# Patient Record
Sex: FEMALE | Race: WHITE
[De-identification: names, ages, dates, MRNs, and addresses within clinical notes are randomized per-mention and may not be internally consistent; named-entity substitution may affect disease eponyms.]

---

## 2018-06-10 ENCOUNTER — HOSPITAL ENCOUNTER (EMERGENCY)
Dept: HOSPITAL 75 - ER | Age: 23
Discharge: HOME | End: 2018-06-10
Payer: MEDICAID

## 2018-06-10 VITALS — HEIGHT: 65 IN | BODY MASS INDEX: 27.66 KG/M2 | WEIGHT: 166 LBS

## 2018-06-10 VITALS — SYSTOLIC BLOOD PRESSURE: 125 MMHG | DIASTOLIC BLOOD PRESSURE: 82 MMHG

## 2018-06-10 DIAGNOSIS — F32.9: ICD-10-CM

## 2018-06-10 DIAGNOSIS — F17.210: ICD-10-CM

## 2018-06-10 DIAGNOSIS — F41.9: ICD-10-CM

## 2018-06-10 DIAGNOSIS — K64.9: Primary | ICD-10-CM

## 2018-06-10 DIAGNOSIS — G47.9: ICD-10-CM

## 2018-06-10 LAB
AMORPH SED URNS QL MICRO: (no result) /LPF
APTT PPP: YELLOW S
BACTERIA #/AREA URNS HPF: NEGATIVE /HPF
BILIRUB UR QL STRIP: NEGATIVE
FIBRINOGEN PPP-MCNC: (no result) MG/DL
GLUCOSE UR STRIP-MCNC: NEGATIVE MG/DL
KETONES UR QL STRIP: NEGATIVE
LEUKOCYTE ESTERASE UR QL STRIP: (no result)
NITRITE UR QL STRIP: NEGATIVE
PH UR STRIP: 7 [PH] (ref 5–9)
PROT UR QL STRIP: NEGATIVE
RBC #/AREA URNS HPF: (no result) /HPF
RENAL EPI CELLS #/AREA URNS HPF: (no result) /HPF
SP GR UR STRIP: 1.01 (ref 1.02–1.02)
UROBILINOGEN UR-MCNC: NORMAL MG/DL
WBC #/AREA URNS HPF: (no result) /HPF

## 2018-06-10 PROCEDURE — 84703 CHORIONIC GONADOTROPIN ASSAY: CPT

## 2018-06-10 PROCEDURE — 81000 URINALYSIS NONAUTO W/SCOPE: CPT

## 2018-06-10 PROCEDURE — 99282 EMERGENCY DEPT VISIT SF MDM: CPT

## 2018-06-10 NOTE — XMS REPORT
Electronic Copy

 Created on: 2016



MARIE BARNETT

External Reference #: Avatar : [id number]

: 1995

Sex: Female



Demographics







 Address  [address] [place] [address]  [address]

 

 Home Phone  [phone]

 

 Preferred Language  Unknown

 

 Marital Status  Unknown

 

 Caodaism Affiliation  Unknown

 

 Race  Unknown

 

 Ethnic Group  Unknown





Author







 Author  ALAN MACDONALD

 

 Organization  Unknown

 

 Address  [address]  [address]



 

 Phone  [phone]







Care Team Providers







 Care Team Member Name  Role  Phone

 

 ALAN MACDONALD  Unavailable  [phone]



                      



Problems

           





 Problem           SNOMED           Onset Date           Resolved Date         
  Status        

 

 Recurrent major depressive episodes, moderate           [id number]           
                       Active        

 

 Posttraumatic stress disorder           [id number]                    
              Active        

 

 Insomnia           [id number]                                  Active
        



                                                 



Allergies, Adverse Reactions

     NA                                  



Care Plan

           

      





 Goal           Instructions        

 

  Child will be functioning well in all rosa.            Further assessment 
by [facility] treatment team.  Engage with treatment team to build rapport.  
Learn and practice coping skills to reduce symptoms and improve functioning.  
The following Services will be utilized 1 - 3 times until goal is reached:     
   

 

  Child will be functioning well in all rosa.            Further assessment 
by [facility] treatment team.  Engage with treatment team to build rapport.  
Learn and practice coping skills to reduce symptoms and improve functioning.  
The following Services will be utilized 1 - 3 times until goal is reached:     
   

 

  Improve and maintain functioning through medical psychiatric services.       
     Initial Psychiatric Evaluation, Ongoing medication monitoring and 
management, Case Conference with multidisciplinary members of the [facility] team as 
indicated, and/or Collaboration and coordination with outside medical providers 
as indicated by providing the following services: 56023 interactive complexity 
40396 psychiatric diagnostic eval w/ meds 96238 30 min psychotherapy add-on 
68918 45 [place] [address] 60 min psychotherapy add-on 29516 med 
injection 42038 New Patient E&M (level 1) 03045 New Patient E&M (level 2) 05109 
New Patient E&M (level 3) 60222 New patient E&M (level 4) 15318 New Patient E&M 
(level 5) 25432 Established Patient E&M (level 1) 14173 Established Patient E&M 
(level 2) 91302 Established Patient E&M (level 3) 71739 Established Patient E&
amp;M (level 4) 82008 Established Patient E&M (level 5) 9935x prolonged service 
code  66943 case conference w/o efrnando & ya w/ MD 86581 case conference w/o fernando mobley
/ MD    Peer Support Individual    Psychosocial Rehab Indiv        



      

      

      





 Date           Time           Service           Provider           Location   
     

 

            05:00:00 pm           [facility]           [address]        



      

                                       



Medications

           





 Medication           Code           Dose,Form,Route,Freq           Start Date 
          End Date        

 

 Rexulti - 1 MG ORAL Tablet           [id number]           Take one (1) Tablet 
Daily                   

 

 Rexulti - 2 MG ORAL Tablet           [id number]           Take one (1) Tablet 
Daily                   

 

 Abilify - 5 MG ORAL Tablet           [id number]           Take one (1) Tablet At 
Bedtime                      2016/01/15        

 

 Rexulti - 1 MG ORAL Tablet           [id number]           Take two (2) Tablets 
Daily                   

 

 Rexulti - 2 MG ORAL Tablet           [id number]           Take one (1) Tablet 
Daily                   



                                                           



Lab Results

     NA                                  



Encounters

           





 Date           Time           Service           Code           Provider        

 

            11:44:00 am                                   ALAN MACDONALD        



                                       



Family History

                                  



Functional Status

     NA                                  



Immunizations

     NA                                  



Vital Signs

           





 Date           Time           BP           Pulse           Temp           
Height           Weight           BMI        

 

            05:56:00 pm           103 over 67           84 bpm       
                67 in           197 lbs           30.9 kg/m^2        



                                       



Social History

           





 Date           Smoking Status           SNOMED Code        

 

            Unknown If Ever Smoked           [id number]        



                                       



Hospital Discharge Instructions

     NA                             



Instructions

                  *         Not Applicable       

                                             



Procedures

     NA                                  



Purpose

           Electronic Copy

## 2018-06-10 NOTE — XMS REPORT
Electronic Copy

 Created on: 2015



MARIE BARNETT

External Reference #: Avatar : [id number]

: 1995

Sex: Female



Demographics







 Address  [address] [place] [address]  [address]

 

 Home Phone  [phone]

 

 Preferred Language  Unknown

 

 Marital Status  Unknown

 

 Adventist Affiliation  Unknown

 

 Race  Unknown

 

 Ethnic Group  Unknown





Author







 Author  ALAN MACDONALD

 

 Organization  Unknown

 

 Address  [address]  [address]



 

 Phone  [phone]







Care Team Providers







 Care Team Member Name  Role  Phone

 

 ALAN MACDONALD  Unavailable  [phone]



                      



Problems

           





 Problem           SNOMED           Onset Date           Resolved Date         
  Status        

 

 N/A           N/A           N/A           N/A           N/A        



                                       



Allergies, Adverse Reactions

     NA                                  



Care Plan

           

      





 Goal           Instructions        

 

  Child will be functioning well in all rosa.            Further assessment 
by [facility] treatment team.  Engage with treatment team to build rapport.  
Learn and practice coping skills to reduce symptoms and improve functioning.  
The following Services will be utilized 1 - 3 times until goal is reached:     
   

 

  Child will be functioning well in all rosa.            Further assessment 
by [facility] treatment team.  Engage with treatment team to build rapport.  
Learn and practice coping skills to reduce symptoms and improve functioning.  
The following Services will be utilized 1 - 3 times until goal is reached:     
   

 

  Improve and maintain functioning through medical psychiatric services.       
     Initial Psychiatric Evaluation, Ongoing medication monitoring and 
management, Case Conference with multidisciplinary members of the [facility] team as 
indicated, and/or Collaboration and coordination with outside medical providers 
as indicated by providing the following services: 68828 interactive complexity 
54548 psychiatric diagnostic eval w/ meds 60054 30 min psychotherapy add-on 
53311 45 [place] [address]7076 60 min psychotherapy add-on 80391 med 
injection 06892 New Patient E&M (level 1) 18964 New Patient E&M (level 2) 77451 
New Patient E&M (level 3) 60142 New patient E&M (level 4) 58545 New Patient E&M 
(level 5) 34257 Established Patient E&M (level 1) 61992 Established Patient E&M 
(level 2) 74164 Established Patient E&M (level 3) 02080 Established Patient E&
amp;M (level 4) 90013 Established Patient E&M (level 5) 9935x prolonged service 
code  10997 case conference w/o clt & fam w/ MD 04091 case conference w/o clt itz
/ MD    Peer Support Individual    Psychosocial Rehab Indiv        



      

      

      





 Date           Time           Service           Provider           Location   
     

 

            05:30:00 pm           [facility]           [address]        



      

                                       



Medications

     NA                                  



Lab Results

     NA                                  



Encounters

           





 Date           Time           Service           Code           Provider        

 

            11:44:00 am                                   ALAN MACDONALD        



                                       



Family History

                                  



Functional Status

     NA                                  



Immunizations

     NA                                  



Vital Signs

     NA                                  



Social History

           





 Date           Smoking Status           SNOMED Code        

 

            Unknown If Ever Smoked           [id number]        



                                       



Hospital Discharge Instructions

     NA                             



Instructions

                  *         Not Applicable       

                                             



Procedures

     NA                                  



Purpose

           Electronic Copy

## 2018-06-10 NOTE — XMS REPORT
Electronic Copy

 Created on: 2016



MARIE BARNETT

External Reference #: Avatar : [id number]

: 1995

Sex: Female



Demographics







 Address  [address] [place] [address]  [address]

 

 Home Phone  [phone]

 

 Preferred Language  Unknown

 

 Marital Status  Unknown

 

 Jain Affiliation  Unknown

 

 Race  Unknown

 

 Ethnic Group  Unknown





Author







 Author  ALAN MACDONALD

 

 Organization  Unknown

 

 Address  [address]  [address]



 

 Phone  [phone]







Care Team Providers







 Care Team Member Name  Role  Phone

 

 ALAN MACDONALD  Unavailable  [phone]



                      



Problems

           





 Problem           SNOMED           Onset Date           Resolved Date         
  Status        

 

 Recurrent major depressive episodes, moderate           [id number]           
                       Active        

 

 Posttraumatic stress disorder           97668875                    
              Active        

 

 Insomnia           [id number]                                  Active
        



                                                 



Allergies, Adverse Reactions

     NA                                  



Care Plan

           

      





 Goal           Instructions        

 

  Child will be functioning well in all rosa.            Further assessment 
by [facility] treatment team.  Engage with treatment team to build rapport.  
Learn and practice coping skills to reduce symptoms and improve functioning.  
The following Services will be utilized 1 - 3 times until goal is reached:     
   

 

  Child will be functioning well in all rosa.            Further assessment 
by [facility] treatment team.  Engage with treatment team to build rapport.  
Learn and practice coping skills to reduce symptoms and improve functioning.  
The following Services will be utilized 1 - 3 times until goal is reached:     
   

 

  Improve and maintain functioning through medical psychiatric services.       
     Initial Psychiatric Evaluation, Ongoing medication monitoring and 
management, Case Conference with multidisciplinary members of the [facility] [place] as 
indicated, and/or Collaboration and coordination with outside medical providers 
as indicated by providing the following services: 55581 interactive complexity 
55386 psychiatric diagnostic eval w/ meds 62885 30 min psychotherapy add-on 
88035 45 [place] [address] 60 min psychotherapy add-on 36277 med 
injection 45841 New Patient E&M (level 1) 49863 New Patient E&M (level 2) 38337 
New Patient E&M (level 3) 84370 New patient E&M (level 4) 66539 New Patient E&M 
(level 5) 05148 Established Patient E&M (level 1) 20823 Established Patient E&M 
(level 2) 51914 Established Patient E&M (level 3) 76965 Established Patient E&
amp;M (level 4) 93207 Established Patient E&M (level 5) 9935x prolonged service 
code  41973 case conference w/o fernando & ya mobley/ MD 52303 case conference w/o fernando mobley
/ MD    Peer Support Individual    Psychosocial Rehab Indiv        

 

  Improve and maintain functioning through medical psychiatric services.       
     Initial Psychiatric Evaluation, Ongoing medication monitoring and 
management, Case Conference with multidisciplinary members of the [facility] [place] as 
indicated, and/or Collaboration and coordination with outside medical providers 
as indicated by providing the following services: 28639 interactive complexity 
13579 psychiatric diagnostic eval w/ meds 96626 30 min psychotherapy add-on 
16971 45 [place] on 36997 60 min psychotherapy add-on 82510 med 
injection 77998 New Patient E&M (level 1) 83643 New Patient E&M (level 2) 16486 
New Patient E&M (level 3) 26267 New patient E&M (level 4) 05260 New Patient E&M 
(level 5) 32286 Established Patient E&M (level 1) 99003 Established Patient E&M 
(level 2) 47214 Established Patient E&M (level 3) 10858 Established Patient E&
amp;M (level 4) 95384 Established Patient E&M (level 5) 9935x prolonged service 
code  71349 case conference w/o fernando & ya aguero MD 05510 case conference w/o fernando mobley
/ MD    Peer Support Individual    Psychosocial Rehab Indiv        



      

      

      





 Date           Time           Service           Provider           Location   
     

 

            05:00:00 pm           [facility]           [address]        



      

                                       



Medications

           





 Medication           Code           Dose,Form,Route,Freq           Start Date 
          End Date        

 

 Rexulti - 1 MG ORAL Tablet           [id number]           Take one (1) Tablet 
Daily                   

 

 Rexulti - 2 MG ORAL Tablet           [id number]           Take one (1) Tablet 
Daily                   

 

 Abilify - 5 MG ORAL Tablet           [id number]           Take one (1) Tablet At 
Bedtime                      2016/01/15        

 

 Rexulti - 1 MG ORAL Tablet           [id number]           Take two (2) Tablets 
Daily                   

 

 Rexulti - 2 MG ORAL Tablet           [id number]           Take one (1) Tablet 
Daily                   



                                                           



Lab Results

     NA                                  



Encounters

           





 Date           Time           Service           Code           Provider        

 

            11:44:00 am                                   ALAN MACDONALD        



                                       



Family History

                                  



Functional Status

     NA                                  



Immunizations

     NA                                  



Vital Signs

           





 Date           Time           BP           Pulse           Temp           
Height           Weight           BMI        

 

            05:56:00 pm           103 over 67           84 bpm       
                67 in           197 lbs           30.9 kg/m^2        



                                       



Social History

           





 Date           Smoking Status           SNOMED Code        

 

            Unknown If Ever Smoked           [id number]        



                                       



Hospital Discharge Instructions

     NA                             



Instructions

                  *         Not Applicable       

                                             



Procedures

     NA                                  



Purpose

           Electronic Copy

## 2018-06-10 NOTE — ED GU-FEMALE
General


Chief Complaint:  Rect Problems


Stated Complaint:  RECTAL BLEEDING


Nursing Triage Note:  


Pt reports rectal bleed issues intermittantly from exposure to anal intercourse 


1 yr ago. No current reported injury and denies any f/u from last yr.


Nursing Sepsis Screen:  No Definite Risk





History of Present Illness


Date Seen by Provider:  Yonis 10, 2018


Time Seen by Provider:  17:10


Initial Comments


22-year-old  female reports one year ago having anal intercourse, 

since then she has been having intermittent bleeding after bowel movements. She 

has tried suppositories little improvement in her symptoms. Today the pain was 

significant after she had a large bowel movement.


Timing/Duration:  getting worse


Severity/Quality:  dull, sharp


Location:  other





Allergies and Home Medications


Allergies


Coded Allergies:  


     No Known Drug Allergies (Unverified , 8/16/09)





Home Medications


Hydrocortisone Acetate 30 Mg Supp.rect, 30 MG RC Q6H PRN for PAIN-MILD


   Prescribed by: TABBY GARCIA on 6/10/18 6867





Patient Home Medication List


Home Medication List Reviewed:  Yes





Review of Systems


Constitutional:  no symptoms reported, see HPI


Genitourinary:  see HPI, other (rectal pain and bleeding)


All Other Systemes Reviewed


Negative Unless Noted:  Yes





Past Medical-Social-Family Hx


Past Med/Social Hx:  Reviewed Nursing Past Med/Soc Hx


Patient Social History


Alcohol Use:  Rarely Uses


Recreational Drug Use:  No


Smoking Status:  Current Everyday Smoker


Type Used:  Cigarettes


2nd Hand Smoke Exposure:  No


Recent Foreign Travel:  No


Contact w/Someone Who Travel:  No


Recent Infectious Disease Expo:  No


Recent Hopitalizations:  No





Seasonal Allergies


Seasonal Allergies:  No





Past Medical History


Surgeries:  No


Respiratory:  No


Cardiac:  No


Neurological:  No


Genitourinary:  No


Gastrointestinal:  No


Musculoskeletal:  No


Endocrine:  No


HEENT:  No


Cancer:  No


Psychosocial:  Yes


Sleep Difficulties, Anxiety, Depression


Integumentary:  No


Blood Disorders:  No


Adverse Reaction/Blood Tranf:  No





Physical Exam


Vital Signs





Vital Signs - First Documented








 6/10/18





 16:20


 


Temp 97.2


 


Pulse 93


 


Resp 20


 


B/P (MAP) 125/82 (96)


 


Pulse Ox 98


 


O2 Delivery Room Air





Capillary Refill : Less Than 3 Seconds


General Appearance:  WD/WN, no apparent distress


Cardiovascular:  normal peripheral pulses, regular rate, rhythm


Respiratory:  chest non-tender, lungs clear, normal breath sounds


Gastrointestinal:  normal bowel sounds, non tender, soft


Rectal:  normal exam, normal rectal tone, hemorrhoids (external on the left, 

internal hemorrhoids inflamed and tender. No fissures or active bleeding noted)


Neurologic/Psychiatric:  no motor/sensory deficits, alert, normal mood/affect, 

oriented x 3





Progress/Results/Core Measures


Suspected Sepsis


Recent Fever Within 48 Hours:  No


Infection Criteria Present:  None


New/Unexplained  Altered Menta:  No


Sepsis Screen:  No Definite Risk


SIRS


Temperature:97.2 


Pulse: 93 


Respiratory Rate: 20


 


Blood Pressure 125 /82 


Mean: 96





Results/Orders


Lab Results





Laboratory Tests








Test


 6/10/18


16:49 Range/Units


 


 


Urine Color YELLOW   


 


Urine Clarity VERY CLOUDY H  


 


Urine pH 7  5-9  


 


Urine Specific Gravity 1.010 L 1.016-1.022  


 


Urine Protein NEGATIVE  NEGATIVE  


 


Urine Glucose (UA) NEGATIVE  NEGATIVE  


 


Urine Ketones NEGATIVE  NEGATIVE  


 


Urine Nitrite NEGATIVE  NEGATIVE  


 


Urine Bilirubin NEGATIVE  NEGATIVE  


 


Urine Urobilinogen NORMAL  NORMAL  MG/DL


 


Urine Leukocyte Esterase 3+ H NEGATIVE  


 


Urine RBC (Auto) 3+ H NEGATIVE  


 


Urine RBC RARE   /HPF


 


Urine WBC 0-2   /HPF


 


Urine Squamous Epithelial


Cells 10-25 H


  /HPF





 


Urine Renal Epithelial Cells NONE   /HPF


 


Urine Crystals PRESENT H  /LPF


 


Urine Amorphous Sediment


 LARGE EMILIANA


URATES H  /LPF





 


Urine Bacteria NEGATIVE   /HPF


 


Urine Casts NONE   /LPF


 


Urine Mucus NEGATIVE   /LPF


 


Urine Culture Indicated NO   








My Orders





Orders - TABBY GARCIA


Urine Pregnancy Bedside (6/10/18 16:54)


Ua Culture If Indicated (6/10/18 16:54)


Urine Pregnancy Bedside (6/10/18 16:55)





Vital Signs/I&O











 6/10/18 6/10/18





 16:20 17:40


 


Temp 97.2 97.2


 


Pulse 93 93


 


Resp 20 20


 


B/P (MAP) 125/82 (96) 125/82 (96)


 


Pulse Ox 98 98


 


O2 Delivery Room Air 





Capillary Refill : Less Than 3 Seconds








Blood Pressure Mean:  96











Point of Care Testing


Urine Pregnancy-Bedside:  Negative





Departure


Impression





 Primary Impression:  


 Hemorrhoids


 Qualified Codes:  K64.1 - Second degree hemorrhoids


 Additional Impression:  


 Rectal bleeding


Disposition:  01 HOME, SELF-CARE


Condition:  Stable





Departure-Patient Inst.


Decision time for Depature:  17:30


Referrals:  


GEOFFREY LUCIA DO (PCP/Family)


Primary Care Physician


Patient Instructions:  Hemorrhoids (DC)





Add. Discharge Instructions:  


Take an over-the-counter daily stool softener, Docusate Sodium 100 mg twice 

daily. 


Warm sitz baths. 


Avoid anything being inserted in Rectum. 


Use Steroid Suppositories as directed. 


Follow-up with Dr. Lucia if symptoms are not improving or worsen to 

consider referral to general surgery for further treatment of the hemorrhoids


Return to emergency department for new urgent health care needs.





All discharge instructions reviewed with patient and/or family. Voiced 

understanding.


Scripts


Hydrocortisone Acetate (Hydrocortisone Acetate) 30 Mg Supp.rect


30 MG RC Q6H PRN for PAIN-MILD, #15 SUPP.RECT 0 Refills


   Prov: TABBY GARCIA         6/10/18





Copy


Copies To 1:   GEOFFREY LUCIA DO











TABBY GARCIA Yonis 10, 2018 17:34

## 2018-06-10 NOTE — XMS REPORT
Electronic Copy

 Created on: 2015



MARIE BARNETT

External Reference #: Avatar : [id number]

: 1995

Sex: Female



Demographics







 Address  [address] [place] [address]  [address]

 

 Home Phone  [phone]

 

 Preferred Language  Unknown

 

 Marital Status  Unknown

 

 Lutheran Affiliation  Unknown

 

 Race  Unknown

 

 Ethnic Group  Unknown





Author







 Author  ALAN MACDONALD

 

 Organization  Unknown

 

 Address  [address]  [address]



 

 Phone  [phone]







Care Team Providers







 Care Team Member Name  Role  Phone

 

 ALAN MACDONALD  Unavailable  [phone]



                      



Problems

           





 Problem           SNOMED           Onset Date           Resolved Date         
  Status        

 

 N/A           N/A           N/A           N/A           N/A        



                                       



Allergies, Adverse Reactions

     NA                                  



Care Plan

           

      





 Goal           Instructions        

 

  Child will be functioning well in all rosa.            Further assessment 
by [facility] treatment team.  Engage with treatment team to build rapport.  
Learn and practice coping skills to reduce symptoms and improve functioning.  
The following Services will be utilized 1 - 3 times until goal is reached:     
   

 

  Child will be functioning well in all rosa.            Further assessment 
by [facility] treatment team.  Engage with treatment team to build rapport.  
Learn and practice coping skills to reduce symptoms and improve functioning.  
The following Services will be utilized 1 - 3 times until goal is reached:     
   

 

  Improve and maintain functioning through medical psychiatric services.       
     Initial Psychiatric Evaluation, Ongoing medication monitoring and 
management, Case Conference with multidisciplinary members of the [facility] team as 
indicated, and/or Collaboration and coordination with outside medical providers 
as indicated by providing the following services: 74151 interactive complexity 
80444 psychiatric diagnostic eval w/ meds 92361 30 min psychotherapy add-on 
18865 45 [place] [address] 60 min psychotherapy add-on 27073 med 
injection 32711 New Patient E&M (level 1) 04944 New Patient E&M (level 2) 15344 
New Patient E&M (level 3) 82639 New patient E&M (level 4) 93134 New Patient E&M 
(level 5) 44541 Established Patient E&M (level 1) 41717 Established Patient E&M 
(level 2) 56399 Established Patient E&M (level 3) 49553 Established Patient E&
amp;M (level 4) 12093 Established Patient E&M (level 5) 9935x prolonged service 
code  29428 case conference w/o clt & fam w/ MD 30566 case conference w/o clt itz
/ MD    Peer Support Individual    Psychosocial Rehab Indiv        



      

      

      





 Date           Time           Service           Provider           Location   
     

 

            03:30:00 pm           NEW PATIENT E&M LEVEL III           
[facility] [address] [address]        



      

                                       



Medications

     NA                                  



Lab Results

     NA                                  



Encounters

           





 Date           Time           Service           Code           Provider        

 

            11:44:00 am                                   ALAN MACDONALD        

 

            12:00:00 am           PSYCHIATRIC DIAGNOSTIC EVALUATION  
         84850           ALAN MACDONALD        



                                       



Family History

                                  



Functional Status

     NA                                  



Immunizations

     NA                                  



Vital Signs

     NA                                  



Social History

           





 Date           Smoking Status           SNOMED Code        

 

            Unknown If Ever Smoked           [id number]        



                                       



Hospital Discharge Instructions

     NA                             



Instructions

                  *         Not Applicable       

                                             



Procedures

     NA                                  



Purpose

           Electronic Copy

## 2018-06-10 NOTE — XMS REPORT
Electronic Copy

 Created on: 2016



MARIE BARNETT

External Reference #: Avatar : [id number]

: 1995

Sex: Female



Demographics







 Address  [address] [place] [address]  [address]

 

 Home Phone  [phone]

 

 Preferred Language  Unknown

 

 Marital Status  Unknown

 

 Muslim Affiliation  Unknown

 

 Race  Unknown

 

 Ethnic Group  Unknown





Author







 Author  ALAN MACDONALD

 

 Organization  Unknown

 

 Address  [address]  [address]



 

 Phone  [phone]







Care Team Providers







 Care Team Member Name  Role  Phone

 

 ALAN MACDONALD  Unavailable  [phone]



                      



Problems

           





 Problem           SNOMED           Onset Date           Resolved Date         
  Status        

 

 Recurrent major depressive episodes, moderate           [id number]           
                       Active        

 

 Posttraumatic stress disorder           [id number]                    
              Active        

 

 Insomnia           [id number]                                  Active
        



                                                 



Allergies, Adverse Reactions

     NA                                  



Care Plan

           

      





 Goal           Instructions        

 

  Child will be functioning well in all rosa.            Further assessment 
by [facility] treatment team.  Engage with treatment team to build rapport.  
Learn and practice coping skills to reduce symptoms and improve functioning.  
The following Services will be utilized 1 - 3 times until goal is reached:     
   

 

  Child will be functioning well in all rosa.            Further assessment 
by [facility] treatment team.  Engage with treatment team to build rapport.  
Learn and practice coping skills to reduce symptoms and improve functioning.  
The following Services will be utilized 1 - 3 times until goal is reached:     
   

 

  Improve and maintain functioning through medical psychiatric services.       
     Initial Psychiatric Evaluation, Ongoing medication monitoring and 
management, Case Conference with multidisciplinary members of the [facility] team as 
indicated, and/or Collaboration and coordination with outside medical providers 
as indicated by providing the following services: 99969 interactive complexity 
98420 psychiatric diagnostic eval w/ meds 59104 30 min psychotherapy add-on 
66257 45 [place] on 625[address] 60 min psychotherapy add-on 53001 med 
injection 93603 New Patient E&M (level 1) 95393 New Patient E&M (level 2) 94244 
New Patient E&M (level 3) 49899 New patient E&M (level 4) 07116 New Patient E&M 
(level 5) 95729 Established Patient E&M (level 1) 89874 Established Patient E&M 
(level 2) 15633 Established Patient E&M (level 3) 46978 Established Patient E&
amp;M (level 4) 32468 Established Patient E&M (level 5) 9935x prolonged service 
code  68047 case conference w/o fernando & ya aguero MD 71623 case conference w/o fernando mobley
/ MD    Peer Support Individual    Psychosocial Rehab Indiv        

 

  Improve and maintain functioning through medical psychiatric services.       
     Initial Psychiatric Evaluation, Ongoing medication monitoring and 
management, Case Conference with multidisciplinary members of the [facility] team as 
indicated, and/or Collaboration and coordination with outside medical providers 
as indicated by providing the following services: 70432 interactive complexity 
87899 psychiatric diagnostic eval w/ meds 96994 30 min psychotherapy add-on 
27903 45 [place] on 28424 60 min psychotherapy add-on 12982 med 
injection 99146 New Patient E&M (level 1) 96364 New Patient E&M (level 2) 11047 
New Patient E&M (level 3) 25681 New patient E&M (level 4) 32965 New Patient E&M 
(level 5) 14103 Established Patient E&M (level 1) 13213 Established Patient E&M 
(level 2) 85513 Established Patient E&M (level 3) 95294 Established Patient E&
amp;M (level 4) 11089 Established Patient E&M (level 5) 9935x prolonged service 
code  36236 case conference w/o fernando & ya aguero MD 86784 case conference w/o fernando mobley
/ MD    Peer Support Individual    Psychosocial Rehab Indiv        



      

      

      

                                  



Medications

           





 Medication           Code           Dose,Form,Route,Freq           Start Date 
          End Date        

 

 Rexulti - 1 MG ORAL Tablet           3074103           Take one (1) Tablet 
Daily                   

 

 Rexulti - 2 MG ORAL Tablet           9887833           Take one (1) Tablet 
Daily                   

 

 Abilify - 5 MG ORAL Tablet           364847           Take one (1) Tablet At 
Bedtime                   

 

 Rexulti - 1 MG ORAL Tablet           5604168           Take two (2) Tablets 
Daily                   

 

 Rexulti - 2 MG ORAL Tablet           [id number]           Take one (1) Tablet 
Daily                   

 

 Citalopram - 20 MG ORAL Tablet           536706           Take one (1) Tablet 
Daily                   



                                                                



Lab Results

     NA                                  



Encounters

           





 Date           Time           Service           Code           Provider        

 

            11:44:00 am                                   ALAN MACDONALD        



                                       



Family History

                                  



Functional Status

     NA                                  



Immunizations

     NA                                  



Vital Signs

           





 Date           Time           BP           Pulse           Temp           
Height           Weight           BMI        

 

            05:15:00 pm           106 over 70           85 bpm       
                67 in           192.7 lbs           30.2 kg/m^2        

 

            05:56:00 pm           103 over 67           84 bpm       
                67 in           197 lbs           30.9 kg/m^2        



                                            



Social History

           





 Date           Smoking Status           SNOMED Code        

 

            Unknown If Ever Smoked           [id number]        



                                       



Hospital Discharge Instructions

     NA                             



Instructions

                  *         Not Applicable       

                                             



Procedures

     NA                                  



Purpose

           Electronic Copy

## 2018-06-10 NOTE — XMS REPORT
Electronic Copy

 Created on: 03/15/2016



MARIE BARNETT

External Reference #: Avatar : [id number]

: 1995

Sex: Female



Demographics







 Address  [address] [place] [address]  [address]

 

 Home Phone  [phone]

 

 Preferred Language  Unknown

 

 Marital Status  Unknown

 

 Sikh Affiliation  Unknown

 

 Race  Unknown

 

 Ethnic Group  Unknown





Author







 Author  ALAN MACDONALD

 

 Organization  Unknown

 

 Address  [address] [place] [address]  [address]



 

 Phone  [phone]







Care Team Providers







 Care Team Member Name  Role  Phone

 

 ALAN MACDONALD  Unavailable  [phone]

 

 DOTGWENDOLYN GARCÍA  Unavailable  [phone]



                      



Problems

           





 Problem           SNOMED           Onset Date           Resolved Date         
  Status        

 

 Recurrent major depressive episodes, moderate           [id number]           
                       Active        

 

 Posttraumatic stress disorder           [id number]                    
              Active        

 

 Insomnia           [id number]                                  Active
        

 

 Review of medication           [id number]                            
      Active        



                                                      



Allergies, Adverse Reactions

     NA                                  



Care Plan

           

      





 Goal           Instructions        

 

  Child will be functioning well in all rosa.            Further assessment 
by [facility] treatment team.  Engage with treatment team to build rapport.  
Learn and practice coping skills to reduce symptoms and improve functioning.  
The following Services will be utilized 1 - 3 times until goal is reached:     
   

 

  Child will be functioning well in all rosa.            Further assessment 
by [facility] treatment team.  Engage with treatment team to build rapport.  
Learn and practice coping skills to reduce symptoms and improve functioning.  
The following Services will be utilized 1 - 3 times until goal is reached:     
   

 

  Improve and maintain functioning through medical psychiatric services.       
     Initial Psychiatric Evaluation, Ongoing medication monitoring and 
management, Case Conference with multidisciplinary members of the [facility] team as 
indicated, and/or Collaboration and coordination with outside medical providers 
as indicated by providing the following services: 13578 interactive complexity 
60118 psychiatric diagnostic eval w/ meds 21086 30 min psychotherapy add-on 
56270 45 [place] 479[address] 60 min psychotherapy add-on 18412 med 
injection 38935 New Patient E&M (level 1) 12185 New Patient E&M (level 2) 03657 
New Patient E&M (level 3) 41128 New patient E&M (level 4) 70047 New Patient E&M 
(level 5) 87251 Established Patient E&M (level 1) 90604 Established Patient E&M 
(level 2) 47343 Established Patient E&M (level 3) 46903 Established Patient E&
amp;M (level 4) 90390 Established Patient E&M (level 5) 9935x prolonged service 
code  19220 case conference w/o fernando & ya mobley/ MD 52853 case conference w/o fernando mobley
/ MD    Peer Support Individual    Psychosocial Rehab Indiv        

 

  Improve and maintain functioning through medical psychiatric services.       
     Initial Psychiatric Evaluation, Ongoing medication monitoring and 
management, Case Conference with multidisciplinary members of the [facility] team as 
indicated, and/or Collaboration and coordination with outside medical providers 
as indicated by providing the following services: 53022 interactive complexity 
95767 psychiatric diagnostic eval w/ meds 60607 30 min psychotherapy add-on 
72545 45 [place] on 269[address] 60 min psychotherapy add-on 86146 med 
injection 57414 New Patient E&M (level 1) 40409 New Patient E&M (level 2) 76458 
New Patient E&M (level 3) 43203 New patient E&M (level 4) 19369 New Patient E&M 
(level 5) 89820 Established Patient E&M (level 1) 62520 Established Patient E&M 
(level 2) 79473 Established Patient E&M (level 3) 83066 Established Patient E&
amp;M (level 4) 51695 Established Patient E&M (level 5) 9935x prolonged service 
code  45901 case conference w/o mac[place] [address] case conference w/o fernando mobley
/ MD    Peer Support Individual    Psychosocial Rehab Indiv        

 

  Improve and maintain functioning through medical psychiatric services.       
     Initial Psychiatric Evaluation, Ongoing medication monitoring and 
management, Case Conference with multidisciplinary members of the [facility] team as 
indicated, and/or Collaboration and coordination with outside medical providers 
as indicated by providing the following services: 96460 interactive complexity 
11150 psychiatric diagnostic eval w/ meds 52316 30 min psychotherapy add-on 
45522 45 [place] [address] 60 min psychotherapy add-on 32733 med 
injection 89106 New Patient E&M (level 1) 34375 New Patient E&M (level 2) 29762 
New Patient E&M (level 3) 33725 New patient E&M (level 4) 94248 New Patient E&M 
(level 5) 78573 Established Patient E&M (level 1) 85894 Established Patient E&M 
(level 2) 05315 Established Patient E&M (level 3) 85347 Established Patient E&
amp;M (level 4) 83721 Established Patient E&M (level 5) 9935x prolonged service 
code  37131 case conference w/o clt & fam w/ MD 26913 case conference w/o clt w
/ MD    Peer Support Individual    Psychosocial Rehab Indiv        

 

  Client will get engaged with therapist and develop treatment goals.          
  Providers will meet with Clt. to build rapport, develop treatment goals, and 
teach client coping  skills to decrease symptoms.          



      

      

      





 Date           Time           Service           Provider           Location   
     

 

            06:00:00 pm           PSYCHOTHERAPY, 38-52 MINUTES       
    GWENDOLYN DOT           [address]        

 

            06:00:00 pm           PSYCHOTHERAPY, 38-52 MINUTES       
    GWENDOLYN DOT           [address]        

 

            06:00:00 pm           PSYCHOTHERAPY, 38-52 MINUTES       
    GWENDOLYN DOT           [address]        

 

            06:00:00 pm           PSYCHOTHERAPY, 38-52 MINUTES       
    GWENDOLYN DOT           [address]        

 

            06:00:00 pm           PSYCHOTHERAPY, 38-52 MINUTES       
    GWENDOLYN DOT           [address]        

 

            06:00:00 pm           PSYCHOTHERAPY, 38-52 MINUTES       
    GWENDOLYN DOT           [address]        

 

            06:00:00 pm           PSYCHOTHERAPY, 38-52 MINUTES       
    GWENDOLYN DOT           [address]        

 

            06:00:00 pm           PSYCHOTHERAPY, 38-52 MINUTES       
    GWENDOLYN DOT           [address]        

 

            06:00:00 pm           PSYCHOTHERAPY, 38-52 MINUTES       
    GWENDOLYN DOT           [address]        

 

            06:00:00 pm           PSYCHOTHERAPY, 38-52 MINUTES       
    GWENDOLYN DOT           [address]        

 

            06:00:00 pm           PSYCHOTHERAPY, 38-52 MINUTES       
    GWENDOLYN DOT           [address]        

 

            06:00:00 pm           PSYCHOTHERAPY, 38-52 MINUTES       
    GWENDOLYN DOT           [address]        

 

            06:00:00 pm           PSYCHOTHERAPY, 38-52 MINUTES       
    GWENDOLYN DOT           [address]        

 

            06:00:00 pm           PSYCHOTHERAPY, 38-52 MINUTES       
    GWENDOLYN DOT           [address]        

 

            06:00:00 pm           PSYCHOTHERAPY, 38-52 MINUTES       
    GWENDOLYN DOT           [address]        

 

            06:00:00 pm           PSYCHOTHERAPY, 38-52 MINUTES       
    GWENDOLYN DOT           [address]        

 

            06:00:00 pm           PSYCHOTHERAPY, 38-52 MINUTES       
    GWENDOLYN DOT           [address]        

 

            06:00:00 pm           PSYCHOTHERAPY, 38-52 MINUTES       
    GWENDOLYN DOT           [address]        

 

            06:00:00 pm           PSYCHOTHERAPY, 38-52 MINUTES       
    GWENDOLYN DOT           [address]        

 

            06:00:00 pm           PSYCHOTHERAPY, 38-52 MINUTES       
    GWENDOLYN DOT           [address]        

 

            06:00:00 pm           PSYCHOTHERAPY, 38-52 MINUTES       
    GWENDOLYN DOT           [address]        

 

            06:00:00 pm           PSYCHOTHERAPY, 38-52 MINUTES       
    GWENDOLYN DOT           [address]        

 

            06:00:00 pm           PSYCHOTHERAPY, 38-52 MINUTES       
    GWENDOLYN DOT           [address]        

 

            06:00:00 pm           PSYCHOTHERAPY, 38-52 MINUTES       
    GWENDOLYN DOT           [address]        

 

            06:00:00 pm           PSYCHOTHERAPY, 38-52 MINUTES       
    GWENDOLYN DOT           [address]        

 

 2016/09/15           06:00:00 pm           PSYCHOTHERAPY, 38-52 MINUTES       
    GWENDOLYN DOT           [address]        

 

            06:00:00 pm           PSYCHOTHERAPY, 38-52 MINUTES       
    GWENDOLYN DOT           [address]        

 

            06:00:00 pm           PSYCHOTHERAPY, 38-52 MINUTES       
    GWENDOLYN DOT           [address]        

 

 2016/10/06           06:00:00 pm           PSYCHOTHERAPY, 38-52 MINUTES       
    GWENDOLYN DOT           [address]        

 

 2016/10/13           06:00:00 pm           PSYCHOTHERAPY, 38-52 MINUTES       
    GWENDOLYN DOT           [address]        

 

 2016/10/20           06:00:00 pm           PSYCHOTHERAPY, 38-52 MINUTES       
    GWENDOLYN DOT           [address]        

 

 2016/10/27           06:00:00 pm           PSYCHOTHERAPY, 38-52 MINUTES       
    GWENDOLYN DOT           [address]        

 

            06:00:00 pm           PSYCHOTHERAPY, 38-52 MINUTES       
    GWENDOLYN DOT           [address]        

 

 2016/11/10           06:00:00 pm           PSYCHOTHERAPY, 38-52 MINUTES       
    GWENDOLYN DOT           [address]        

 

            06:00:00 pm           PSYCHOTHERAPY, 38-52 MINUTES       
    GWENDOLYN DOT           [address]        

 

            06:00:00 pm           PSYCHOTHERAPY, 38-52 MINUTES       
    GWENDOLYN DOT           [address]        

 

            06:00:00 pm           PSYCHOTHERAPY, 38-52 MINUTES       
    GWENDOLYN DOT           [address]        

 

            06:00:00 pm           PSYCHOTHERAPY, 38-52 MINUTES       
    GWENDOLYN DOT           [address]        

 

 2016/12/15           06:00:00 pm           PSYCHOTHERAPY, 38-52 MINUTES       
    GWENDOLYN DOT           [address]        

 

            06:00:00 pm           PSYCHOTHERAPY, 38-52 MINUTES       
    GWENDOLYN DOT           [address]        

 

            06:00:00 pm           PSYCHOTHERAPY, 38-52 MINUTES       
    GWENDOLYN DOT           [address]        

 

            06:00:00 pm           PSYCHOTHERAPY, 38-52 MINUTES       
    GWENDOLYN DOT           [address]        

 

            06:00:00 pm           PSYCHOTHERAPY, 38-52 MINUTES       
    GWENDOLYN DOT           [address]        

 

            06:00:00 pm           PSYCHOTHERAPY, 38-52 MINUTES       
    GWENDOLYN DOT           [address]        

 

            06:00:00 pm           PSYCHOTHERAPY, 38-52 MINUTES       
    GWENDOLYN DOT           [address]        

 

            06:00:00 pm           PSYCHOTHERAPY, 38-52 MINUTES       
    GWENDOLYN DOT           [address]        

 

            06:00:00 pm           PSYCHOTHERAPY, 38-52 MINUTES       
    GWENDOLYN DOT           [address]        

 

            06:00:00 pm           PSYCHOTHERAPY, 38-52 MINUTES       
    GWENDOLYN DOT           [address]        

 

            06:00:00 pm           PSYCHOTHERAPY, 38-52 MINUTES       
    GWENDOLYN DOT           [address]        

 

            06:00:00 pm           PSYCHOTHERAPY, 38-52 MINUTES       
    GWENDOLYN DOT           [address]        

 

            06:00:00 pm           PSYCHOTHERAPY, 38-52 MINUTES       
    GWENDOLYN DOT           [address]        

 

            06:00:00 pm           PSYCHOTHERAPY, 38-52 MINUTES       
    GWENDOLYN DOT           [address]        

 

            06:00:00 pm           PSYCHOTHERAPY, 38-52 MINUTES       
    GWENDOLYN CHRIS           [address]        



      

                                                                               
                                                                               
                                                                               
                                                              



Medications

           





 Medication           Code           Dose,Form,Route,Freq           Start Date 
          End Date        

 

 Rexulti - 1 MG ORAL Tablet           [id number]           Take one (1) Tablet 
Daily                   

 

 Rexulti - 2 MG ORAL Tablet           [id number]           Take one (1) Tablet 
Daily                   

 

 Abilify - 5 MG ORAL Tablet           [id number]           Take one (1) Tablet At 
Bedtime                   

 

 Rexulti - 1 MG ORAL Tablet           [id number]           Take two (2) Tablets 
Daily                   

 

 Rexulti - 2 MG ORAL Tablet           [id number]           Take one (1) Tablet 
Daily                   

 

 Citalopram - 20 MG ORAL Tablet           2003           Take one (1) Tablet 
Daily                   

 

 Citalopram - 20 MG ORAL Tablet           2003           Take one (1) Tablet 
Daily           2016/03/15                   



                                                                     



Lab Results

     NA                                  



Encounters

           





 Date           Time           Service           Code           Provider        

 

            11:44:00 am                                   ALAN MACDONALD        

 

            11:03:00 am                                   ALAN MACDONALD        



                                            



Family History

                                  



Functional Status

     NA                                  



Immunizations

     NA                                  



Vital Signs

           





 Date           Time           BP           Pulse           Temp           
Height           Weight           BMI        

 

 2016/03/15           06:17:00 pm           129 over 77           73 bpm       
                67 in           202 lbs           31.6 kg/m^2        

 

            05:15:00 pm           106 over 70           85 bpm       
                67 in           192.7 lbs           30.2 kg/m^2        

 

            05:56:00 pm           103 over 67           84 bpm       
                67 in           197 lbs           30.9 kg/m^2        



                                                 



Social History

           





 Date           Smoking Status           SNOMED Code        

 

            Unknown If Ever Smoked           [id number]        



                                       



Hospital Discharge Instructions

     NA                             



Instructions

                  *         Not Applicable       

                                             



Procedures

     NA                                  



Purpose

           Electronic Copy

## 2018-06-10 NOTE — XMS REPORT
Electronic Copy

 Created on: 2015



MARIE BARNETT

External Reference #: Avatar : [id number]

: 1995

Sex: Female



Demographics







 Address  [address] [place] [address]  [address]

 

 Home Phone  [phone]

 

 Preferred Language  Unknown

 

 Marital Status  Unknown

 

 Church Affiliation  Unknown

 

 Race  Unknown

 

 Ethnic Group  Unknown





Author







 Author  ALAN MACDONALD

 

 Organization  Unknown

 

 Address  [address]  [address]



 

 Phone  [phone]







Care Team Providers







 Care Team Member Name  Role  Phone

 

 MACDONALDALAN  Unavailable  [phone]



                      



Problems

           





 Problem           SNOMED           Onset Date           Resolved Date         
  Status        

 

 Recurrent major depressive episodes, moderate                       
                       Active        

 

 Posttraumatic stress disorder           [id number]                    
              Active        

 

 Insomnia           [id number]                                  Active
        



                                                 



Allergies, Adverse Reactions

     NA                                  



Care Plan

           

      





 Goal           Instructions        

 

  Child will be functioning well in all rosa.            Further assessment 
by [facility] treatment team.  Engage with treatment team to build rapport.  
Learn and practice coping skills to reduce symptoms and improve functioning.  
The following Services will be utilized 1 - 3 times until goal is reached:     
   

 

  Child will be functioning well in all rosa.            Further assessment 
by [facility] treatment team.  Engage with treatment team to build rapport.  
Learn and practice coping skills to reduce symptoms and improve functioning.  
The following Services will be utilized 1 - 3 times until goal is reached:     
   

 

  Improve and maintain functioning through medical psychiatric services.       
     Initial Psychiatric Evaluation, Ongoing medication monitoring and 
management, Case Conference with multidisciplinary members of the [facility] team as 
indicated, and/or Collaboration and coordination with outside medical providers 
as indicated by providing the following services: 56218 interactive complexity 
15833 psychiatric diagnostic eval w/ meds 98555 30 min psychotherapy add-on 
08931 45 [place] [address]793 60 min psychotherapy add-on 89180 med 
injection 03784 New Patient E&M (level 1) 06015 New Patient E&M (level 2) 06065 
New Patient E&M (level 3) 63117 New patient E&M (level 4) 47469 New Patient E&M 
(level 5) 12137 Established Patient E&M (level 1) 48250 Established Patient E&M 
(level 2) 36905 Established Patient E&M (level 3) 63622 Established Patient E&
amp;M (level 4) 55857 Established Patient E&M (level 5) 9935x prolonged service 
code  97373 case conference w/o fernando & ya w/ MD 37504 case conference w/o fernando mobley
/ MD    Peer Support Individual    Psychosocial Rehab Indiv        



      

      

      

                                  



Medications

           





 Medication           Code           Dose,Form,Route,Freq           Start Date 
          End Date        

 

 Rexulti - 1 MG ORAL Tablet           [id number]           Take one (1) Tablet 
Daily                   

 

 Rexulti - 2 MG ORAL Tablet           [id number]           Take one (1) Tablet 
Daily                   

 

 Abilify - 5 MG ORAL Tablet           172415           Take one (1) Tablet At 
Bedtime                      2016/01/15        



                                                 



Lab Results

     NA                                  



Encounters

           





 Date           Time           Service           Code           Provider        

 

            11:44:00 am                                   ALAN MACDONALD        



                                       



Family History

                                  



Functional Status

     NA                                  



Immunizations

     NA                                  



Vital Signs

           





 Date           Time           BP           Pulse           Temp           
Height           Weight           BMI        

 

            05:56:00 pm           103 over 67           84 bpm       
                67 in           197 lbs           30.9 kg/m^2        



                                       



Social History

           





 Date           Smoking Status           SNOMED Code        

 

            Unknown If Ever Smoked           [id number]        



                                       



Hospital Discharge Instructions

     NA                             



Instructions

                  *         Not Applicable       

                                             



Procedures

     NA                                  



Purpose

           Electronic Copy

## 2018-06-10 NOTE — XMS REPORT
Electronic Copy

 Created on: 2016



MARIE BARNETT

External Reference #: Avatar : [id number]

: 1995

Sex: Female



Demographics







 Address  [address] [place] [address]  [address]

 

 Home Phone  [phone]

 

 Preferred Language  Unknown

 

 Marital Status  Unknown

 

 Sikhism Affiliation  Unknown

 

 Race  Unknown

 

 Ethnic Group  Unknown





Author







 Author  ALAN MACDONALD

 

 Organization  Unknown

 

 Address  [address]  [address]



 

 Phone  [phone]







Care Team Providers







 Care Team Member Name  Role  Phone

 

 ALAN MACDONALD  Unavailable  [phone]



                      



Problems

           





 Problem           SNOMED           Onset Date           Resolved Date         
  Status        

 

 Recurrent major depressive episodes, moderate           [id number]           
                       Active        

 

 Posttraumatic stress disorder           [id number]                    
              Active        

 

 Insomnia           [id number]                                  Active
        



                                                 



Allergies, Adverse Reactions

     NA                                  



Care Plan

           

      





 Goal           Instructions        

 

  Child will be functioning well in all rosa.            Further assessment 
by [facility] treatment team.  Engage with treatment team to build rapport.  
Learn and practice coping skills to reduce symptoms and improve functioning.  
The following Services will be utilized 1 - 3 times until goal is reached:     
   

 

  Child will be functioning well in all rosa.            Further assessment 
by [facility] treatment team.  Engage with treatment team to build rapport.  
Learn and practice coping skills to reduce symptoms and improve functioning.  
The following Services will be utilized 1 - 3 times until goal is reached:     
   

 

  Improve and maintain functioning through medical psychiatric services.       
     Initial Psychiatric Evaluation, Ongoing medication monitoring and 
management, [place] [facility] [place] 
indicated, and/or Collaboration and coordination with outside medical providers 
as indicated by providing the following services: 73406 interactive complexity 
11182 psychiatric diagnostic eval w/ meds 42738 30 min psychotherapy add-on 
39181 45 [place] [address] 60 min psychotherapy add-on 47259 med 
injection 72695 New Patient E&M (level 1) 63987 New Patient E&M (level 2) 04595 
New Patient E&M (level 3) 19092 New patient E&M (level 4) 79835 New Patient E&M 
(level 5) 87250 Established Patient E&M (level 1) 17294 Established Patient E&M 
(level 2) 47354 Established Patient E&M (level 3) 98337 Established Patient E&
amp;M (level 4) 43722 Established Patient E&M (level 5) 9935x prolonged service 
code  32988 case conference w/o fernando & ya aguero MD 81096 case conference w/o fernando mobley
/ MD    Peer Support Individual    Psychosocial Rehab Indiv        

 

  Improve and maintain functioning through medical psychiatric services.       
     Initial Psychiatric Evaluation, Ongoing medication monitoring and 
management, [place] [facility] [place], and/or Collaboration and coordination with outside medical providers 
as indicated by providing the following services: 69873 interactive complexity 
84346 psychiatric diagnostic eval w/ meds 92939 30 min psychotherapy add-on 
82737 45 [place] [address] 60 min psychotherapy add-on 49521 med 
injection 90175 New Patient E&M (level 1) 27328 New Patient E&M (level 2) 99207 
New Patient E&M (level 3) 68370 New patient E&M (level 4) 06659 New Patient E&M 
(level 5) 13986 Established Patient E&M (level 1) 58499 Established Patient E&M 
(level 2) 96923 Established Patient E&M (level 3) 88116 Established Patient E&
amp;M (level 4) 01705 Established Patient E&M (level 5) 9935x prolonged service 
code  34243 case conference w/o fernando & ya aguero MD 70350 case conference w/o fernando mobley
/ MD    Peer Support Individual    Psychosocial Rehab Indiv        



      

      

      

                                  



Medications

           





 Medication           Code           Dose,Form,Route,Freq           Start Date 
          End Date        

 

 Rexulti - 1 MG ORAL Tablet           [id number]           Take one (1) Tablet 
Daily                   

 

 Rexulti - 2 MG ORAL Tablet           1023499           Take one (1) Tablet 
Daily                   

 

 Abilify - 5 MG ORAL Tablet           179919           Take one (1) Tablet At 
Bedtime                   

 

 Rexulti - 1 MG ORAL Tablet           7285018           Take two (2) Tablets 
Daily                   

 

 Rexulti - 2 MG ORAL Tablet           9830120           Take one (1) Tablet 
Daily                   

 

 Citalopram - 20 MG ORAL Tablet           394950           Take one (1) Tablet 
Daily                   



                                                                



Lab Results

     NA                                  



Encounters

           





 Date           Time           Service           Code           Provider        

 

            11:44:00 am                                   ALAN MACDONALD        



                                       



Family History

                                  



Functional Status

     NA                                  



Immunizations

     NA                                  



Vital Signs

           





 Date           Time           BP           Pulse           Temp           
Height           Weight           BMI        

 

            05:15:00 pm           106 over 70           85 bpm       
                67 in           192.7 lbs           30.2 kg/m^2        

 

            05:56:00 pm           103 over 67           84 bpm       
                67 in           197 lbs           30.9 kg/m^2        



                                            



Social History

           





 Date           Smoking Status           SNOMED Code        

 

            Unknown If Ever Smoked           [id number]        



                                       



Hospital Discharge Instructions

     NA                             



Instructions

                  *         Not Applicable       

                                             



Procedures

     NA                                  



Purpose

           Electronic Copy

## 2018-06-10 NOTE — XMS REPORT
Electronic Copy

 Created on: 2016



MARIE BARNETT

External Reference #: Avatar : [id number]

: 1995

Sex: Female



Demographics







 Address  [address] [place] [address]  [address]

 

 Home Phone  [phone]

 

 Preferred Language  Unknown

 

 Marital Status  Unknown

 

 Orthodoxy Affiliation  Unknown

 

 Race  Unknown

 

 Ethnic Group  Unknown





Author







 Author  ALAN MACDONALD

 

 Organization  Unknown

 

 Address  [address]  [address]



 

 Phone  [phone]







Care Team Providers







 Care Team Member Name  Role  Phone

 

 ALAN MACDONALD  Unavailable  [phone]



                      



Problems

           





 Problem           SNOMED           Onset Date           Resolved Date         
  Status        

 

 Recurrent major depressive episodes, moderate           [id number]           
                       Active        

 

 Posttraumatic stress disorder           1997                    
              Active        

 

 Insomnia           [id number]                                  Active
        



                                                 



Allergies, Adverse Reactions

     NA                                  



Care Plan

           

      





 Goal           Instructions        

 

  Child will be functioning well in all rosa.            Further assessment 
by [facility] treatment team.  Engage with treatment team to build rapport.  
Learn and practice coping skills to reduce symptoms and improve functioning.  
The following Services will be utilized 1 - 3 times until goal is reached:     
   

 

  Child will be functioning well in all rosa.            Further assessment 
by [facility] treatment team.  Engage with treatment team to build rapport.  
Learn and practice coping skills to reduce symptoms and improve functioning.  
The following Services will be utilized 1 - 3 times until goal is reached:     
   

 

  Improve and maintain functioning through medical psychiatric services.       
     Initial Psychiatric Evaluation, Ongoing medication monitoring and 
management, [place] [facility] [place], and/or Collaboration and coordination with outside medical providers 
as indicated by providing the following services: 41772 interactive complexity 
63958 psychiatric diagnostic eval w/ meds 31174 30 min psychotherapy add-on 
88688 45 [place] on 458[address] 60 min psychotherapy add-on 17799 med 
injection 32329 New Patient E&M (level 1) 28453 New Patient E&M (level 2) 28752 
New Patient E&M (level 3) 27398 New patient E&M (level 4) 54298 New Patient E&M 
(level 5) 40179 Established Patient E&M (level 1) 13617 Established Patient E&M 
(level 2) 98423 Established Patient E&M (level 3) 68861 Established Patient E&
amp;M (level 4) 12539 Established Patient E&M (level 5) 9935x prolonged service 
code  47213 case conference w/o fernando & ya aguero MD 10140 case conference w/o fernando mobley
/ MD    Peer Support Individual    Psychosocial Rehab Indiv        

 

  Improve and maintain functioning through medical psychiatric services.       
     Initial Psychiatric Evaluation, Ongoing medication monitoring and 
management, [place] [facility] [place], and/or Collaboration and coordination with outside medical providers 
as indicated by providing the following services: 91144 interactive complexity 
38943 psychiatric diagnostic eval w/ meds 62148 30 min psychotherapy add-on 
56584 45 [place] [address] 60 min psychotherapy add-on 31946 med 
injection 83695 New Patient E&M (level 1) 23013 New Patient E&M (level 2) 10283 
New Patient E&M (level 3) 33294 New patient E&M (level 4) 41913 New Patient E&M 
(level 5) 26156 Established Patient E&M (level 1) 97620 Established Patient E&M 
(level 2) 50737 Established Patient E&M (level 3) 26546 Established Patient E&
amp;M (level 4) 25045 Established Patient E&M (level 5) 9935x prolonged service 
code  39695 case conference w/o fernando & ya aguero MD 01295 case conference w/o fernando mobley
/ MD    Peer Support Individual    Psychosocial Rehab Indiv        



      

      

      

                                  



Medications

           





 Medication           Code           Dose,Form,Route,Freq           Start Date 
          End Date        

 

 Rexulti - 1 MG ORAL Tablet           1453254           Take one (1) Tablet 
Daily                   

 

 Rexulti - 2 MG ORAL Tablet           0731445           Take one (1) Tablet 
Daily                   

 

 Abilify - 5 MG ORAL Tablet           861685           Take one (1) Tablet At 
Bedtime                   

 

 Rexulti - 1 MG ORAL Tablet           9131634           Take two (2) Tablets 
Daily                   

 

 Rexulti - 2 MG ORAL Tablet           8029983           Take one (1) Tablet 
Daily                   

 

 Citalopram - 20 MG ORAL Tablet           972799           Take one (1) Tablet 
Daily                   



                                                                



Lab Results

     NA                                  



Encounters

           





 Date           Time           Service           Code           Provider        

 

            11:44:00 am                                   ALAN MACDONALD        



                                       



Family History

                                  



Functional Status

     NA                                  



Immunizations

     NA                                  



Vital Signs

           





 Date           Time           BP           Pulse           Temp           
Height           Weight           BMI        

 

            05:15:00 pm           106 over 70           85 bpm       
                67 in           192.7 lbs           30.2 kg/m^2        

 

            05:56:00 pm           103 over 67           84 bpm       
                67 in           197 lbs           30.9 kg/m^2        



                                            



Social History

           





 Date           Smoking Status           SNOMED Code        

 

            Unknown If Ever Smoked           [id number]        



                                       



Hospital Discharge Instructions

     NA                             



Instructions

                  *         Not Applicable       

                                             



Procedures

     NA                                  



Purpose

           Electronic Copy

## 2018-06-10 NOTE — XMS REPORT
Electronic Copy

 Created on: 2016



MARIE BARNETT

External Reference #: Avatar : [id number]

: 1995

Sex: Female



Demographics







 Address  [address] [place] [address]  [address]

 

 Home Phone  [phone]

 

 Preferred Language  Unknown

 

 Marital Status  Unknown

 

 Christian Affiliation  Unknown

 

 Race  Unknown

 

 Ethnic Group  Unknown





Author







 Author  ALAN GARCIA

 

 Organization  Unknown

 

 Address  [address] [place] [address]  [address]



 

 Phone  [phone]







Care Team Providers







 Care Team Member Name  Role  Phone

 

 ALAN GARCIA  Unavailable  [phone]

 

 GWENDOLYN CHRIS  Unavailable  [phone]



                      



Problems

           





 Problem           SNOMED           Onset Date           Resolved Date         
  Status        

 

 Recurrent major depressive episodes, moderate           010142370           
                       Active        

 

 Posttraumatic stress disorder           94717873                    
              Active        

 

 Insomnia           986195446                                  Active
        

 

 Review of medication           559258704                            
      Active        

 

 Body mass index 30+ - obesity           813804427           2016/05/10        
               Active        



                                                           



Allergies, Adverse Reactions

     NA                                  



Care Plan

           

      

      

      





 Date           Time           Service           Provider           Location   
     

 

            06:00:00 pm           PSYCHOTHERAPY, 38-52 MINUTES       
    GWENDOLYN DOT           [address]        

 

            06:00:00 pm           PSYCHOTHERAPY, 38-52 MINUTES       
    GWENDOLYN DOT           [address]        

 

            06:00:00 pm           PSYCHOTHERAPY, 38-52 MINUTES       
    GWENDOLYN DOT           [address]        

 

            06:00:00 pm           PSYCHOTHERAPY, 38-52 MINUTES       
    GWENDOLYN DOT           [address]        

 

            06:00:00 pm           PSYCHOTHERAPY, 38-52 MINUTES       
    GWENDOLYN DOT           [address]        

 

            06:00:00 pm           PSYCHOTHERAPY, 38-52 MINUTES       
    GWENDOLYN DTO           [address]        

 

            06:00:00 pm           PSYCHOTHERAPY, 38-52 MINUTES       
    GWENDOLYN DOT           [address]        

 

            06:00:00 pm           PSYCHOTHERAPY, 38-52 MINUTES       
    GWENDOLYN DOT           [address]        

 

            06:00:00 pm           PSYCHOTHERAPY, 38-52 MINUTES       
    GWENDOLYN DOT           [address]        

 

            06:00:00 pm           PSYCHOTHERAPY, 38-52 MINUTES       
    GWENDOLYN DOT           [address]        

 

            06:00:00 pm           PSYCHOTHERAPY, 38-52 MINUTES       
    GWENDOLYN DOT           [address]        

 

            06:00:00 pm           PSYCHOTHERAPY, 38-52 MINUTES       
    GWENDOLYN DOT           [address]        

 

            06:00:00 pm           PSYCHOTHERAPY, 38-52 MINUTES       
    GWENDOLYN DOT           [address]        

 

            06:00:00 pm           PSYCHOTHERAPY, 38-52 MINUTES       
    GWENDOLYN DOT           [address]        

 

            06:00:00 pm           PSYCHOTHERAPY, 38-52 MINUTES       
    GWENDOLYN DOT           [address]        

 

 2016/09/15           06:00:00 pm           PSYCHOTHERAPY, 38-52 MINUTES       
    GWENDOLYN DOT           [address]        

 

            06:00:00 pm           PSYCHOTHERAPY, 38-52 MINUTES       
    GWENDOLYN DOT           [address]        

 

            06:00:00 pm           PSYCHOTHERAPY, 38-52 MINUTES       
    GWENDOLYN DOT           [address]        

 

 2016/10/06           06:00:00 pm           PSYCHOTHERAPY, 38-52 MINUTES       
    GWENDOLYN DOT           [address]        

 

 2016/10/13           06:00:00 pm           PSYCHOTHERAPY, 38-52 MINUTES       
    GWENDOLYN DOT           [address]        

 

 2016/10/20           06:00:00 pm           PSYCHOTHERAPY, 38-52 MINUTES       
    GWENDOLYN DOT           [address]        

 

 2016/10/27           06:00:00 pm           PSYCHOTHERAPY, 38-52 MINUTES       
    GWENDOLYN DOT           [address]        

 

            06:00:00 pm           PSYCHOTHERAPY, 38-52 MINUTES       
    GWENDOLYN DOT           [address]        

 

 2016/11/10           06:00:00 pm           PSYCHOTHERAPY, 38-52 MINUTES       
    GWENDOLYN DOT           [address]        

 

            06:00:00 pm           PSYCHOTHERAPY, 38-52 MINUTES       
    GWENDOLYN DOT           [address]        

 

            06:00:00 pm           PSYCHOTHERAPY, 38-52 MINUTES       
    GWENDOLYN DOT           [address]        

 

            06:00:00 pm           PSYCHOTHERAPY, 38-52 MINUTES       
    GWENDOLYN DOT           [address]        

 

            06:00:00 pm           PSYCHOTHERAPY, 38-52 MINUTES       
    GWENDOLYN DOT           [address]        

 

 2016/12/15           06:00:00 pm           PSYCHOTHERAPY, 38-52 MINUTES       
    GWENDOLYN DOT           [address]        

 

            06:00:00 pm           PSYCHOTHERAPY, 38-52 MINUTES       
    GWENDOLYN DOT           [address]        

 

            06:00:00 pm           PSYCHOTHERAPY, 38-52 MINUTES       
    GWENDOLYN DOT           [address]        

 

            06:00:00 pm           PSYCHOTHERAPY, 38-52 MINUTES       
    GWENDOLYN DOT           [address]        

 

            06:00:00 pm           PSYCHOTHERAPY, 38-52 MINUTES       
    GWENDOLYN DOT           [address]        

 

            06:00:00 pm           PSYCHOTHERAPY, 38-52 MINUTES       
    GWENDOLYN DOT           [address]        

 

            06:00:00 pm           PSYCHOTHERAPY, 38-52 MINUTES       
    GWENDOLYN DOT           [address]        

 

            06:00:00 pm           PSYCHOTHERAPY, 38-52 MINUTES       
    GWENDOLYN DOT           [address]        

 

            06:00:00 pm           PSYCHOTHERAPY, 38-52 MINUTES       
    GWENDOLYN DOT           [address]        

 

            06:00:00 pm           PSYCHOTHERAPY, 38-52 MINUTES       
    GWENDOLYN DOT           [address]        

 

            06:00:00 pm           PSYCHOTHERAPY, 38-52 MINUTES       
    GWENDOLYN DOT           [address]        

 

            06:00:00 pm           PSYCHOTHERAPY, 38-52 MINUTES       
    GWENDOLYN DOT           [address]        

 

            06:00:00 pm           PSYCHOTHERAPY, 38-52 MINUTES       
    GWENDOLYN DOT           [address]        

 

            06:00:00 pm           PSYCHOTHERAPY, 38-52 MINUTES       
    GWENDOLYN DOT           [address]        

 

            06:00:00 pm           PSYCHOTHERAPY, 38-52 MINUTES       
    GWENDOLYN DOT           [address]        

 

            06:00:00 pm           PSYCHOTHERAPY, 38-52 MINUTES       
    GWENDOLYN DOT           [address]        

 

            06:00:00 pm           PSYCHOTHERAPY, 38-52 MINUTES       
    GWENDOLYN DOT           [address]        

 

            06:00:00 pm           PSYCHOTHERAPY, 38-52 MINUTES       
    GWENDOLYN DOT           [address]        

 

            06:00:00 pm           PSYCHOTHERAPY, 38-52 MINUTES       
    GWENDOLYN DOT           [address]        

 

            06:00:00 pm           PSYCHOTHERAPY, 38-52 MINUTES       
    GWENDOLYN DOT           [address]        

 

            06:00:00 pm           PSYCHOTHERAPY, 38-52 MINUTES       
    GWENDOLYN DOT           [address]        

 

            06:00:00 pm           PSYCHOTHERAPY, 38-52 MINUTES       
    GWENDOLYN CHRIS           [address]        



      

                                                                               
                                                                               
                                                                               
                                               



Medications

           





 Medication           Code           Dose,Form,Route,Freq           Start Date 
          End Date        

 

 Rexulti - 1 MG ORAL Tablet           3374637           Take one (1) Tablet 
Daily                   

 

 Rexulti - 2 MG ORAL Tablet           4772614           Take one (1) Tablet 
Daily                   

 

 Abilify - 5 MG ORAL Tablet           274477           Take one (1) Tablet At 
Bedtime                   

 

 Rexulti - 1 MG ORAL Tablet           0842387           Take two (2) Tablets 
Daily                   

 

 Rexulti - 2 MG ORAL Tablet           0847448           Take one (1) Tablet 
Daily                   

 

 Citalopram - 20 MG ORAL Tablet           2003           Take one (1) Tablet 
Daily                   

 

 Citalopram - 20 MG ORAL Tablet           234987           Take one (1) Tablet 
Daily           2016/03/15                   

 

 Citalopram - 20 MG ORAL Tablet           056054           Take one (1) Tablet 
Daily                      2016/09/10        

 

 Citalopram - 20 MG ORAL Tablet           357234           Take one (1) Tablet 
Daily           2016/05/10                   



                                                                               



Lab Results

     NA                                  



Encounters

           





 Date           Time           Service           Code           Provider        

 

            11:44:00 am                                   ALAN MACDONALD
%        

 

            11:03:00 am                                   ALAN MACDONALD
%        



                                            



Family History

                                  



Functional Status

     NA                                  



Immunizations

     NA                                  



Vital Signs

           





 Date           Time           BP           Pulse           Temp           
Height           Weight           BMI        

 

 2016/05/10           04:15:00 pm           126 over 78           81 bpm       
                67 in           223.6 lbs           35 kg/m^2        

 

 2016/03/15           06:17:00 pm           129 over 77           73 bpm       
                67 in           202 lbs           31.6 kg/m^2        

 

            05:15:00 pm           106 over 70           85 bpm       
                67 in           192.7 lbs           30.2 kg/m^2        

 

            05:56:00 pm           103 over 67           84 bpm       
                67 in           197 lbs           30.9 kg/m^2        



                                                      



Social History

     NA                                  



Hospital Discharge Instructions

     NA                             



Instructions

                  *         Not Applicable       

                                             



Procedures

     NA                                  



Purpose

           Electronic Copy

## 2018-06-10 NOTE — XMS REPORT
[facility]

 Created on: 2018



Mirtha Machado

External Reference #: [id number]

: 1995

Sex: Female



Demographics







 Address  [address]  [address]

 

 Preferred Language  Unknown

 

 Marital Status  Unknown

 

 Yazidism Affiliation  Unknown

 

 Race  Unknown

 

 Ethnic Group  Unknown





Author







 Author  LISA Alvarez

 

 Organization  [facility]

 

 Address  [address] [place]  [address]



 

 Phone  [phone]







Care Team Providers







 Care Team Member Name  Role  Phone

 

 Antonio  LISA  Unavailable  [phone]







PROBLEMS







 Type  Condition  ICD9-CM Code  SFS47-CI Code  Onset Dates  Condition Status  
SNOMED Code

 

 Problem  Other diseases of nasal cavity and sinuses  478.19        Active  
686638055

 

 Problem  Night terrors     F51.4     Active  84143103

 

 Problem  Allergic rhinitis, cause unspecified  477.9        Active  84742441

 

 Problem  Counseling on other sexually transmitted diseases  V65.45        
Active  062789170

 

 Problem  General counseling for prescription of oral contraceptives  V25.01   
     Active  [id number]

 

 Problem  Pregnancy examination or test, negative result  V72.41        Active  
937137951

 

 Problem  Moderate episode of recurrent major depressive disorder     F33.1     
Active  690163955

 

 Problem  Depression, reactive     F32.9     Active  50581447

 

 Problem  Dysthymia     F34.1     Active  79072271

 

 Problem  Panic attack     F41.0     Active  636635861

 

 Problem  Anxiety     F41.9     Active  37138044

 

 Problem  Post traumatic stress disorder (PTSD)     F43.10     Active  93587218







ALLERGIES

No Information



ENCOUNTERS







 Encounter  Location  Date  Diagnosis

 

 [facility] [place] [id number] [place] [phone]    Moderate episode of recurrent major depressive disorder 
F33.1

 

 [facility]  [id number] [place] [address]
87[address]    Moderate episode of recurrent major depressive disorder 
F33.1

 

 [place] [address] [place] [phone]  31 Oct, 2017  Anxiety F41.9 ; Post traumatic stress disorder (PTSD) 
F43.10 ; Panic attack F41.0 ; Dysthymia F34.1 ; Night terrors F51.4 and 
Depression, reactive F32.9

 

 [facility]  [address] [facility] [id number] [address]     

 

 [facility]  [address] [address]     

 

 [facility]  [address] [address]  03 Sep, 2014   

 

 [facility]  [address] [address]  03 Sep, 2014   

 

 [facility]  [address] [address]     

 

 [facility]  [id number] [address]     







IMMUNIZATIONS

No Known Immunizations



SOCIAL HISTORY

Never Assessed



REASON FOR VISIT

 intake



PLAN OF CARE







 Activity  Details









  









 Follow Up  Not rescheduled with me Reason:anxiety, depression, PTSD







VITAL SIGNS





MEDICATIONS

No Known Medications



RESULTS

No Results



PROCEDURES







 Procedure  Date Ordered  Result  Body Site

 

 Psych diagnostic evaluation, new patient  Oct 31, 2017      







INSTRUCTIONS





MEDICATIONS ADMINISTERED

No Known Medications



MEDICAL (GENERAL) HISTORY







 Type  Description  Date

 

 Medical History  PTSD   

 

 Medical History  depression   

 

 Medical History  insomnia

## 2018-06-10 NOTE — XMS REPORT
Electronic Copy

 Created on: 2016



MARIE BARNETT

External Reference #: Avatar : [id number]

: 1995

Sex: Female



Demographics







 Address  [address] [place] [address]  [address]

 

 Home Phone  [phone]

 

 Preferred Language  Unknown

 

 Marital Status  Unknown

 

 Adventist Affiliation  Unknown

 

 Race  Unknown

 

 Ethnic Group  Unknown





Author







 Author  ALAN MACDONALD

 

 Organization  Unknown

 

 Address  [address]  [address]



 

 Phone  [phone]







Care Team Providers







 Care Team Member Name  Role  Phone

 

 ALAN MACDONALD  Unavailable  [phone]



                      



Problems

           





 Problem           SNOMED           Onset Date           Resolved Date         
  Status        

 

 Recurrent major depressive episodes, moderate           279823853           
                       Active        

 

 Posttraumatic stress disorder           54779772                    
              Active        

 

 Insomnia           905116344                                  Active
        



                                                 



Allergies, Adverse Reactions

     NA                                  



Care Plan

           

      





 Goal           Instructions        

 

  Child will be functioning well in all rosa.            Further assessment 
by [facility] treatment team.  Engage with treatment team to build rapport.  
Learn and practice coping skills to reduce symptoms and improve functioning.  
The following Services will be utilized 1 - 3 times until goal is reached:     
   

 

  Child will be functioning well in all rosa.            Further assessment 
by [facility] treatment team.  Engage with treatment team to build rapport.  
Learn and practice coping skills to reduce symptoms and improve functioning.  
The following Services will be utilized 1 - 3 times until goal is reached:     
   

 

  Improve and maintain functioning through medical psychiatric services.       
     Initial Psychiatric Evaluation, Ongoing medication monitoring and 
management, Case Conference with multidisciplinary members of the [facility] team as 
indicated, and/or Collaboration and coordination with outside medical providers 
as indicated by providing the following services: 59959 interactive complexity 
43784 psychiatric diagnostic eval w/ meds 54711 30 min psychotherapy add-on 
55885 45 [place] psychotherapy [place] on 74855 60 min psychotherapy add-on 43521 med 
injection 23843 New Patient E&M (level 1) 36001 New Patient E&M (level 2) 22875 
New Patient E&M (level 3) 56054 New patient E&M (level 4) 77310 New Patient E&M 
(level 5) 91673 Established Patient E&M (level 1) 97364 Established Patient E&M 
(level 2) 94250 Established Patient E&M (level 3) 21046 Established Patient E&
amp;M (level 4) 64290 Established Patient E&M (level 5) 9935x prolonged service 
code  70589 case conference w/o fernando & ya w/ MD 82273 case conference w/o fernando mobley
/ MD    Peer Support Individual    Psychosocial Rehab Indiv        



      

      

      





 Date           Time           Service           Provider           Location   
     

 

            05:00:00 pm           [facility]           [address]        



      

                                       



Medications

           





 Medication           Code           Dose,Form,Route,Freq           Start Date 
          End Date        

 

 Rexulti - 1 MG ORAL Tablet           4588768           Take one (1) Tablet 
Daily                   

 

 Rexulti - 2 MG ORAL Tablet           0681226           Take one (1) Tablet 
Daily                   

 

 Abilify - 5 MG ORAL Tablet           106689           Take one (1) Tablet At 
Bedtime                      2016/01/15        

 

 Rexulti - 1 MG ORAL Tablet           9145824           Take two (2) Tablets 
Daily                   

 

 Rexulti - 2 MG ORAL Tablet           4350667           Take one (1) Tablet 
Daily                   



                                                           



Lab Results

     NA                                  



Encounters

           





 Date           Time           Service           Code           Provider        

 

            11:44:00 am                                   ALAN MACDONALD        



                                       



Family History

                                  



Functional Status

     NA                                  



Immunizations

     NA                                  



Vital Signs

           





 Date           Time           BP           Pulse           Temp           
Height           Weight           BMI        

 

            05:56:00 pm           103 over 67           84 bpm       
                67 in           197 lbs           30.9 kg/m^2        



                                       



Social History

           





 Date           Smoking Status           SNOMED Code        

 

            Unknown If Ever Smoked           [id number]        



                                       



Hospital Discharge Instructions

     NA                             



Instructions

                  *         Not Applicable       

                                             



Procedures

     NA                                  



Purpose

           Electronic Copy

## 2018-09-05 ENCOUNTER — HOSPITAL ENCOUNTER (EMERGENCY)
Dept: HOSPITAL 75 - ER | Age: 23
Discharge: HOME | End: 2018-09-05
Payer: MEDICAID

## 2018-09-05 VITALS — SYSTOLIC BLOOD PRESSURE: 141 MMHG | DIASTOLIC BLOOD PRESSURE: 102 MMHG

## 2018-09-05 VITALS — WEIGHT: 210 LBS | BODY MASS INDEX: 31.83 KG/M2 | HEIGHT: 68 IN

## 2018-09-05 DIAGNOSIS — F43.10: ICD-10-CM

## 2018-09-05 DIAGNOSIS — F41.9: ICD-10-CM

## 2018-09-05 DIAGNOSIS — F32.9: ICD-10-CM

## 2018-09-05 DIAGNOSIS — N39.0: Primary | ICD-10-CM

## 2018-09-05 DIAGNOSIS — R10.2: ICD-10-CM

## 2018-09-05 DIAGNOSIS — F17.210: ICD-10-CM

## 2018-09-05 LAB
APTT PPP: (no result) S
BACTERIA #/AREA URNS HPF: (no result) /HPF
BILIRUB UR QL STRIP: NEGATIVE
FIBRINOGEN PPP-MCNC: (no result) MG/DL
GLUCOSE UR STRIP-MCNC: NEGATIVE MG/DL
KETONES UR QL STRIP: (no result)
LEUKOCYTE ESTERASE UR QL STRIP: (no result)
NITRITE UR QL STRIP: NEGATIVE
PH UR STRIP: 5 [PH] (ref 5–9)
PROT UR QL STRIP: (no result)
RBC #/AREA URNS HPF: >100 /HPF
SP GR UR STRIP: 1.03 (ref 1.02–1.02)
SQUAMOUS #/AREA URNS HPF: (no result) /HPF
UROBILINOGEN UR-MCNC: 1 MG/DL
WBC #/AREA URNS HPF: (no result) /HPF

## 2018-09-05 PROCEDURE — 81000 URINALYSIS NONAUTO W/SCOPE: CPT

## 2018-09-05 PROCEDURE — 84703 CHORIONIC GONADOTROPIN ASSAY: CPT

## 2018-09-05 PROCEDURE — 99283 EMERGENCY DEPT VISIT LOW MDM: CPT

## 2018-09-05 PROCEDURE — 87088 URINE BACTERIA CULTURE: CPT

## 2018-09-05 NOTE — XMS REPORT
[facility] Clinical Summary

 Created on: 2014



Mirtha Machado

External Reference #: [id number]

: 1995

Sex: Female



Demographics







 Address  [address] [place], [place] [address]

 

 Home Phone  [phone]

 

 Preferred Language  English

 

 Marital Status  S

 

 Lutheran Affiliation  Unknown

 

 Race  Unknown

 

 Ethnic Group  Not  or 





Author







 Author  Admin, JC

 

 Organization  [facility]

 

 Address  Unknown

 

 Phone  Unavailable







Allergies, Adverse Reactions, Alerts







 Allergy Name  Reaction Description  Start Date  Severity  Status  Provider

 

 No Known Allergies             COLE Chapa 







Conditions or Problems







 Problem Name  Problem Code  Onset Date  Status  Entry Date  Provider  Comment  
Standard Description  Annotate

 

 Irregular menses  626.4    Active    Armando Barragan MD   
  Irregular menstrual cycle   

 

 Contraceptive management  V25.9    Active    Armando Barragan MD     Encounter for unspecified contraceptive management   

 

 High-risk sexual behavior  V69.2    Active    Fernanda PEDERSON     High-risk sexual behavior   







Medication List







 Medication  Instructions  Start Date  Stop Date  Generic Name  NDC  Status  
Provider  Patient Instruction









 CYCLAFEM  0.5/0.75/1-35 MG-MCG TABS  1 daily       NORETHIN-ETH 
ESTRAD TRIPHASIC  [id number]  Active  Armando Barragan MD     Active







Advance Directives







 Directive Description  Start Date

 

 CONSENT TO MEDICAL CARE  







Vital Signs







 Date  Name  Value  Unit  Range  Description

 

   blood pressure, diastolic - 8462-4  71  mm[Hg]     BP kapoor

 

   blood pressure, systolic - 8480-6  114  mm[Hg]     BP sys

 

   height E&M - 8302-2  67.25  [in_us]     Bdy height

 

   pulse rate E&M - 8867-4  79  /min     Heart rate

 

   temperature E&M  99  [degF]     Body temperature

 

   weight E&M - 3141-9  209  [lb_av]     Weight Measured







Diagnostic Results







 Date  Name  Value  Unit  Range  Description

 

 Lab Report: Chlamydia/GC APTIMA/31989, HIV-1/HIV-2 AB SCREEN W REFL/1972 ... - 
Lab 

 

   chlamydia DNA probe  NOT DETECTED     NOT DETECTED   

 

 Lab Report: Chlamydia/GC APTIMA/81471, HIV-1/HIV-2 AB SCREEN W REFL/1972 ... - 
Microbiology 

 

   Neisseria gonorrhoeae DNA probe  NOT DETECTED     NOT DETECTED   

 

 Lab Report: Chlamydia/GC APTIMA/96463, HIV-1/HIV-2 AB SCREEN W REFL/1972 ... - 
Serology 

 

   rapid plasma reagin antibody titer  NON-REACTIVE     NON-REACTIVE 
  

 

 Lab Report: [facility] - Chemistry 

 

   human chorionic gonadotropin, urine, qualitative (urine pregnancy 
test)  Negative     Negative   







Encounters







 Code  Encounter  Date  Provider  Facility

 

 CPT-96323  Level 2 New Patient   17:55:29 CST  Armando Barragan MD  
[facility]

## 2018-09-05 NOTE — XMS REPORT
[facility] Clinical Summary

 Created on: 12/10/2014



Mirtha Machado

External Reference #: [id number]

: 1995

Sex: Female



Demographics







 Address  [facility] [id number]

 

 Home Phone  [phone]

 

 Preferred Language  English

 

 Marital Status  S

 

 Church Affiliation  Unknown

 

 Race  Unknown

 

 Ethnic Group  Not  or 





Author







 Author  Admin, [facility]

 

 Organization  [facility]

 

 Address  Unknown

 

 Phone  Unavailable







Allergies, Adverse Reactions, Alerts







 Allergy Name  Reaction Description  Start Date  Severity  Status  Provider

 

 Allergies Unknown               







Conditions or Problems







 Problem Name  Problem Code  Onset Date  Status  Entry Date  Provider  Comment  
Standard Description  Annotate

 

 Irregular menses  626.4    Active    Armando Barragan MD   
  Irregular menstrual cycle   

 

 Contraceptive management  V25.9    Active    Armando Barragan MD     Encounter for unspecified contraceptive management   

 

 High-risk sexual behavior  V69.2    Active    Fernanda Yorenaldo PEDERSON     High-risk sexual behavior   







Medication List







 Medication  Instructions  Start Date  Stop Date  Generic Name  NDC  Status  
Provider  Patient Instruction









 CYCLAFEM //7 0.5/0.75/1-35 MG-MCG TABS  1 daily       NORETHIN-ETH 
ESTRAD TRIPHASIC  [id number]  Active  Armando Barragan MD     Active







Diagnostic Results







 Date  Name  Value  Unit  Range  Description

 

 Lab Report: [facility] [place] Chemistry 

 

   human chorionic gonadotropin, urine, qualitative (urine pregnancy 
test)  Negative     Negative   







Encounters







 Code  Encounter  Date  Provider  Facility

 

 CPT-32942  Level 2 New Patient   17:55:29 CST  Armando Barragan MD  
[facility]

## 2018-09-05 NOTE — XMS REPORT
[facility] Clinical Summary

 Created on: 2014



Mirtha Machado

External Reference #: [id number]

: 1995

Sex: Female



Demographics







 Address  [facility] [id number]

 

 Home Phone  [phone]

 

 Preferred Language  English

 

 Marital Status  S

 

 Jain Affiliation  Unknown

 

 Race  Unknown

 

 Ethnic Group  Not  or 





Author







 Author  Admin, ALISA

 

 Organization  [facility]

 

 Address  Unknown

 

 Phone  Unavailable







Allergies, Adverse Reactions, Alerts







 Allergy Name  Reaction Description  Start Date  Severity  Status  Provider

 

 Allergies Unknown               







Conditions or Problems







 Problem Name  Problem Code  Onset Date  Status  Entry Date  Provider  Comment  
Standard Description  Annotate

 

 Irregular menses  626.4    Active    Armando Barragan MD   
  Irregular menstrual cycle   

 

 Contraceptive management  V25.9    Active    Armando Barragan MD     Encounter for unspecified contraceptive management   

 

 High-risk sexual behavior  V69.2    Active    Fernanda Membrenorenaldo PEDERSON     High-risk sexual behavior   







Medication List







 Medication  Instructions  Start Date  Stop Date  Generic Name  NDC  Status  
Provider  Patient Instruction









 CYCLAFEM //7 0.5/0.75/1-35 MG-MCG TABS  1 daily       NORETHIN-ETH 
ESTRAD TRIPHASIC  [id number]  Active  Armando Barragan MD     Active







Advance Directives







 Directive Description  Start Date

 

 CONSENT TO MEDICAL CARE  







Diagnostic Results







 Date  Name  Value  Unit  Range  Description

 

 Lab Report: [facility] - Chemistry 

 

   human chorionic gonadotropin, urine, qualitative (urine pregnancy 
test)  Negative     Negative   







Encounters







 Code  Encounter  Date  Provider  Facility

 

 CPT-55585  Level 2 New Patient   17:55:29 CST  Armando Barragan MD  
[facility]

## 2018-09-05 NOTE — XMS REPORT
Continuity of Care Document

 Created on: 2018



MARIE BARNETT

External Reference #: [id number]

: 1995

Sex: Female



Demographics







 Address  [address]  [address]

 

 Home Phone  [phone]

 

 Preferred Language  Unknown

 

 Marital Status  Unknown

 

 Sabianist Affiliation  Unknown

 

 Race  Unknown

 

 Ethnic Group  Unknown





Author







 Author  [facility]

 

 Address  Unknown

 

 Phone  Unavailable



              



Allergies

      





 Active            Description            Code            Type            
Severity            Reaction            Onset            Reported/Identified   
         Relationship to Patient            Clinical Status        

 

 Yes            No Known Drug Allergies            [id number]            Drug 
Allergy            Mild            N/A                         2009      
                            



                  



Medications

      



There is no data.                  



Problems

      





 Date Dx Coded            Attending            Type            Code            
Diagnosis            Diagnosed By        

 

 2014            JOSE ELIAS LAWTON                         
477.9            ALLERGIC RHINITIS CAUSE UNSPECIFIED                     

 

 2014            JOSE ELIAS LAWTON N                         
478.19            OTHER DISEASES OF NASAL CAVITY AND SINUSES                   
  

 

 2014            ANJU SPRAGUE A                         477.9    
        ALLERGIC RHINITIS CAUSE UNSPECIFIED                     

 

 2014            ANJU SPRAGUE A                         478.19   
         OTHER DISEASES OF NASAL CAVITY AND SINUSES                     

 

 2014            ANJU SPRAGUE A                         V25.01   
         CONTRACEPTION - ORAL CONTRACEPTION                     

 

 2014            ANJU SPRAGUE A                         V65.45   
         STD COUNSELING                     

 

 2014            ANJU SPRAGUE A                         V72.41   
         PREGNANCY TEST NEGATIVE RESULT                     

 

 06/10/2018            TABBY GARCIAP            Ot            F17.210         
   NICOTINE DEPENDENCE, CIGARETTES, UNCOMPL                     

 

 06/10/2018            TABBY GARCIAP            Ot            F32.9            
MAJOR DEPRESSIVE DISORDER, SINGLE EPISOD                     

 

 06/10/2018            TABBY GARCIA ARNP            Ot            F41.9            
ANXIETY DISORDER, UNSPECIFIED                     

 

 06/10/2018            TABBY GARCIAP            Ot            G47.9            
SLEEP DISORDER, UNSPECIFIED                     

 

 06/10/2018            TABBY GARCIA ARNP            Ot            K62.89          
  OTHER SPECIFIED DISEASES OF ANUS AND REC                     

 

 06/10/2018            TABBY GARCIAP            Ot            K64.9            
UNSPECIFIED HEMORRHOIDS                     

 

 2018            TABBY GARCIA ARNP            Ot            F17.210         
   NICOTINE DEPENDENCE, CIGARETTES, UNCOMPL                     

 

 2018            RADHA TABBY ARNP            Ot            F32.9            
MAJOR DEPRESSIVE DISORDER, SINGLE EPISOD                     

 

 2018            RADHA TABBY ARNP            Ot            F41.9            
ANXIETY DISORDER, UNSPECIFIED                     

 

 2018            TABBY GARCIAP            Ot            G47.9            
SLEEP DISORDER, UNSPECIFIED                     

 

 2018            RADHA, TABBY MALCOLMP            Ot            K62.89          
  OTHER SPECIFIED DISEASES OF ANUS AND REC                     

 

 2018            TABBY GARCIAP            Ot            K64.9            
UNSPECIFIED HEMORRHOIDS                     

 

 2018            TABBY GARCIAP            Ot            F17.210         
   NICOTINE DEPENDENCE, CIGARETTES, UNCOMPL                     

 

 2018            TABBY GARCIAP            Ot            F32.9            
MAJOR DEPRESSIVE DISORDER, SINGLE EPISOD                     

 

 2018            RADHA, TABBY ARNP            Ot            F41.9            
ANXIETY DISORDER, UNSPECIFIED                     

 

 2018            RADHA, TABBY ARNP            Ot            G47.9            
SLEEP DISORDER, UNSPECIFIED                     

 

 2018            RADHA, TABBY ARNP            Ot            K62.89          
  OTHER SPECIFIED DISEASES OF ANUS AND REC                     

 

 2018            TABBY GARCIAP            Ot            K64.9            
UNSPECIFIED HEMORRHOIDS                     



                                                                  



Procedures

      





 Code            Description            Performed By            Performed On   
     

 

             37491                                  PREGNANCY TEST, URINE (IN-
HOUSE)                                   2014        

 

             71564                                  STREP A  (IN-HOUSE)        
                           2014        

 

             18817                                  PREGNANCY TEST, URINE (IN-
HOUSE)                                   2014        



                      



Results

      





 Test            Result            Range        









 Complete urinalysis with reflex to culture - 06/10/18 16:49         









 Urine color determination            YELLOW             NRG        

 

 Urine clarity determination            VERY CLOUDY             NRG        

 

 Urine pH measurement by test strip            7             5-9        

 

 Specific gravity of urine by test strip            1.010             1.016-
1.022        

 

 Urine protein assay by test strip, semi-quantitative            NEGATIVE      
       NEGATIVE        

 

 Urine glucose detection by automated test strip            NEGATIVE           
  NEGATIVE        

 

 Erythrocytes detection in urine sediment by light microscopy            3+    
         NEGATIVE        

 

 Urine ketones detection by automated test strip            NEGATIVE           
  NEGATIVE        

 

 Urine nitrite detection by test strip            NEGATIVE             NEGATIVE
        

 

 Urine total bilirubin detection by test strip            NEGATIVE             
NEGATIVE        

 

 Urine urobilinogen measurement by automated test strip (mass/volume)          
  NORMAL             NORMAL        

 

 Urine leukocyte esterase detection by dipstick            3+             
NEGATIVE        

 

 Automated urine sediment erythrocyte count by microscopy (number/high power 
field)            RARE             NRG        

 

 Automated urine sediment leukocyte count by microscopy (number/high power field
)             [HPF]            NRG        

 

 Bacteria detection in urine sediment by light microscopy            NEGATIVE  
           NRG        

 

 Squamous epithelial cells detection in urine sediment by light microscopy     
       10-25             NRG        

 

 Crystals detection in urine sediment by light microscopy            PRESENT   
          NRG        

 

 Casts detection in urine sediment by light microscopy            NONE         
    NRG        

 

 Mucus detection in urine sediment by light microscopy            NEGATIVE     
        NRG        

 

 Complete urinalysis with reflex to culture            NO             NRG      
  

 

 Amorphous sediment detection in urine sediment by light microscopy            
LARGE EMILIANA URATES             NRG        

 

 Renal epithelial cells detection in urine sediment by light microscopy        
    NONE             NRG        



                



Encounters

      





 ACCT No.            Visit Date/Time            Discharge            Status    
        Pt. Type            Provider            Facility            Loc./Unit  
          Complaint        

 

 [id number]            2014 14:24:00            2014 23:59:59          
  CLS            Outpatient            ANJU SPRAGUE                  
                             

 

 [id number]            2014 13:39:00            2014 23:59:59          
  CLS            Outpatient            JOSE ELIAS LAWTON           
                                    

 

 [id number]            06/10/2018 16:12:00            06/10/2018 17:40:00    
        DIS            Emergency            TABBY GARCIA            [address] 
[facility]            ER            RECTAL BLEEDING        

 

 [id number]            2013 15:20:00            2013 23:59:59    
        CLS            Outpatient                                              
              

 

 [id number]            2018 08:20:00            2018 23:59:59            
CLS            Outpatient            NIDIA WILD LAC                         
[facility]                     

 

 [id number]            2018 10:10:34                         ACT            
Unknown

## 2018-09-05 NOTE — XMS REPORT
[facility] Clinical Summary

 Created on: 2015



Mirhta Machado

External Reference #: [id number]

: 1995

Sex: Female



Demographics







 Address  [address] [place], [place] [address]

 

 Home Phone  [phone]

 

 Preferred Language  English

 

 Marital Status  S

 

 Confucianism Affiliation  Unknown

 

 Race  Unknown

 

 Ethnic Group  Not  or 





Author







 Author  Admin, JC

 

 Organization  [facility]

 

 Address  Unknown

 

 Phone  Unavailable







Allergies, Adverse Reactions, Alerts







 Allergy Name  Reaction Description  Start Date  Severity  Status  Provider

 

 No Known Allergies             Winter Osman MA







Conditions or Problems







 Problem Name  Problem Code  Onset Date  Status  Entry Date  Provider  Comment  
Standard Description  Annotate

 

 Irregular menses  626.4    Active    Armando Barragan MD   
  Irregular menstrual cycle   

 

 Contraceptive management  V25.9    Active    Armando Barragan MD     Encounter for unspecified contraceptive management   

 

 High-risk sexual behavior  V69.2    Active    Fernanda PEDERSON     High-risk sexual behavior   

 

 Upper respiratory infection  465.9    Active    Armando Barragan MD     Acute upper respiratory infections of unspecified site   







Medication List







 Medication  Instructions  Start Date  Stop Date  Generic Name  NDC  Status  
Provider  Patient Instruction









 CYCLAFEM  0.5/0.75/1-35 MG-MCG TABS  1 daily       NORETHIN-ETH 
ESTRAD TRIPHASIC  [id number]  Active  Armando Barragan MD     Active







Advance Directives







 Directive Description  Start Date

 

 CONSENT TO MEDICAL CARE  







Vital Signs







 Date  Name  Value  Unit  Range  Description

 

   blood pressure, diastolic - 8462-4  80  mm[Hg]     BP kapoor

 

   blood pressure, systolic - 8480-6  135  mm[Hg]     BP sys

 

   pulse rate E&M - 8867-4  88  /min     Heart rate

 

   temperature E&M  98.5  [degF]     Body temperature

 

   weight E&M - 3141-9  211  [lb_av]     Weight Measured

 

   blood pressure, diastolic - 8462-4  71  mm[Hg]     BP kapoor

 

   blood pressure, systolic - 8480-6  114  mm[Hg]     BP sys

 

   height E&M - 8302-2  67.25  [in_us]     Bdy height

 

   pulse rate E&M - 8867-4  79  /min     Heart rate

 

   temperature E&M  99  [degF]     Body temperature

 

   weight E&M - 3141-9  209  [lb_av]     Weight Measured







Diagnostic Results







 Date  Name  Value  Unit  Range  Description

 

 Lab Report: Chlamydia/GC APTIMA/76911, HIV-1/HIV-2 AB SCREEN W REFL/ ... - 
Lab 

 

   chlamydia DNA probe  NOT DETECTED     NOT DETECTED   

 

 Lab Report: Chlamydia/GC APTIMA/15470, HIV-1/HIV-2 AB SCREEN W REFL/ ... - 
Microbiology 

 

   Neisseria gonorrhoeae DNA probe  NOT DETECTED     NOT DETECTED   

 

 Lab Report: Chlamydia/GC APTIMA/81528, HIV-1/HIV-2 AB SCREEN W REFL/ ... - 
Serology 

 

   rapid plasma reagin antibody titer  NON-REACTIVE     NON-REACTIVE 
  

 

 Lab Report: [facility] [place] - Chemistry 

 

   human chorionic gonadotropin, urine, qualitative (urine pregnancy 
test)  Negative     Negative   







Encounters







 Code  Encounter  Date  Provider  Facility

 

 CPT-53187  Level 3 Est. Patient   15:24:11 CST  Armando Barragan MD  
[facility]

 

 CPT-87761  Level 2 New Patient   17:55:29 CST  Armando Barragan MD  
[facility]

## 2018-09-05 NOTE — XMS REPORT
[facility] Clinical Summary

 Created on: 2014



Mirtha Machado

External Reference #: [id number]

: 1995

Sex: Female



Demographics







 Address  [address] [place]  [address]

 

 Home Phone  [phone]

 

 Preferred Language  English

 

 Marital Status  S

 

 Sikhism Affiliation  Unknown

 

 Race  Unknown

 

 Ethnic Group  Not  or 





Author







 Author  Admin, JC

 

 Organization  [facility]

 

 Address  Unknown

 

 Phone  Unavailable







Allergies, Adverse Reactions, Alerts







 Allergy Name  Reaction Description  Start Date  Severity  Status  Provider

 

 Allergies Unknown               







Conditions or Problems







 Problem Name  Problem Code  Onset Date  Status  Entry Date  Provider  Comment  
Standard Description  Annotate

 

 Irregular menses  626.4    Active    Armando Barragan MD   
  Irregular menstrual cycle   

 

 Contraceptive management  V25.9    Active    Armando Barragan MD     Encounter for unspecified contraceptive management   

 

 High-risk sexual behavior  V69.2    Active    Fernanda Ferrari 
BG     High-risk sexual behavior   







Medication List







 Medication  Instructions  Start Date  Stop Date  Generic Name  NDC  Status  
Provider  Patient Instruction









 CYCLAFEM 7/7/7 0.5/0.75/1-35 MG-MCG TABS  1 daily       NORETHIN-ETH 
ESTRAD TRIPHASIC  [id number]  Active  Armando Barragan MD     Active







Advance Directives







 Directive Description  Start Date

 

 CONSENT TO MEDICAL CARE  







Diagnostic Results







 Date  Name  Value  Unit  Range  Description

 

 Lab Report: Chlamydia/GC APTIMA/26756, HIV-1/HIV-2 AB SCREEN W REFL/ ... - 
Lab 

 

   chlamydia DNA probe  NOT DETECTED     NOT DETECTED   

 

 Lab Report: Chlamydia/GC APTIMA/11120, HIV-1/HIV-2 AB SCREEN W REFL/ ... - 
Microbiology 

 

   Neisseria gonorrhoeae DNA probe  NOT DETECTED     NOT DETECTED   

 

 Lab Report: Chlamydia/GC APTIMA/18245, HIV-1/HIV-2 AB SCREEN W REFL/1972 ... - 
Serology 

 

   rapid plasma reagin antibody titer  NON-REACTIVE     NON-REACTIVE 
  

 

 [place] [facility] 

 

   human chorionic gonadotropin, urine, qualitative (urine pregnancy 
test)  Negative     Negative   







Encounters







 Code  Encounter  Date  Provider  Facility

 

 CPT-42752  Level 2 New Patient   17:55:29 CST  Armando Barragan MD  
[facility]

## 2018-09-05 NOTE — ED GU-FEMALE
General


Chief Complaint:  Abdominal/GI Problems


Stated Complaint:  PELVIC PAIN


Nursing Triage Note:  


LOWER ABDOMINAL PAIN


Nursing Sepsis Screen:  No Definite Risk


Source:  patient, old records


Exam Limitations:  no limitations





History of Present Illness


Date Seen by Provider:  Sep 5, 2018


Time Seen by Provider:  03:18


Initial Comments


This 22-year-old young lady presents to the emergency room with complaints of 

pelvic pain starting at 02:00.  It woke her from sleep.  She reports recurrent 

episodes of this type of pain, the last episode being about 6 months ago.  She 

has had ultrasound imaging performed previously at [facility].  She 

reports it was unremarkable.  She just started her menstrual cycle yesterday 

morning.  She denies any vaginal symptoms such as discharge.  She denies any 

urinary changes.





Allergies and Home Medications


Allergies


Coded Allergies:  


     No Known Drug Allergies (Unverified , 8/16/09)





Home Medications


Cephalexin 500 Mg Capsule, 500 MG PO QID


   Prescribed by: ANUEL BARTON on 9/5/18 0403


Phenazopyridine HCl 200 Mg Tablet, 1 TAB PO TID PRN for PAIN-MODERATE TO SEVERE


   Prescribed by: ANUEL BARTON on 9/5/18 0403





Patient Home Medication List


Home Medication List Reviewed:  Yes





Review of Systems


Review of Systems


Constitutional:  no symptoms reported


EENTM:  no symptoms reported


Respiratory:  no symptoms reported


Cardiovascular:  no symptoms reported


Gastrointestinal:  no symptoms reported


Genitourinary:  see HPI


Pregnant:  No


LMP:  Sep 4, 2018


Musculoskeletal:  no symptoms reported


Skin:  no symptoms reported


Psychiatric/Neurological:  No Symptoms Reported


Endocrine:  No Symptoms Reported





Past Medical-Social-Family Hx


Patient Social History


Alcohol Use:  Denies Use


Recreational Drug Use:  No


Smoking Status:  Current Everyday Smoker


Type Used:  Cigarettes


2nd Hand Smoke Exposure:  No


Recent Foreign Travel:  No


Contact w/Someone Who Travel:  No


Recent Infectious Disease Expo:  No


Recent Hopitalizations:  No





Immunizations Up To Date


Tetanus Booster (TDap):  Unknown





Seasonal Allergies


Seasonal Allergies:  No





Past Medical History


Surgeries:  No


Respiratory:  No


Cardiac:  No


Neurological:  No


Pregnant:  No


Last Menstrual Period:  Sep 4, 2018


Genitourinary:  No


Gastrointestinal:  No


Musculoskeletal:  No


Endocrine:  No


HEENT:  No


Cancer:  No


Psychosocial:  Yes


Sleep Difficulties, Anxiety, PTSD, Depression


Integumentary:  No


Blood Disorders:  No


Adverse Reaction/Blood Tranf:  No





Physical Exam


Vital Signs





Vital Signs - First Documented








 9/5/18





 03:32


 


Temp 96.9


 


Pulse 72


 


Resp 18


 


B/P (MAP) 141/102 (115)


 


Pulse Ox 100


 


O2 Delivery Room Air





Capillary Refill : Less Than 3 Seconds


Height, Weight, BMI


Height: 5'8.00"


Weight: 210lbs. oz. 95.360698bz;  BMI


Method:Stated


General Appearance:  WD/WN, no apparent distress


HEENT:  normal ENT inspection


Cardiovascular:  regular rate, rhythm, no edema, no murmur


Respiratory:  lungs clear, normal breath sounds, no respiratory distress, no 

accessory muscle use


Gastrointestinal:  normal bowel sounds, soft, tenderness (suprapubic)


Extremities:  normal inspection


Neurologic/Psychiatric:  CNs II-XII nml as tested, no motor/sensory deficits, 

alert, normal mood/affect, oriented x 3


Skin:  normal color, warm/dry





Progress/Results/Core Measures


Suspected Sepsis


Recent Fever Within 48 Hours:  No


Infection Criteria Present:  None


New/Unexplained  Altered Menta:  No


Sepsis Screen:  No Definite Risk


SIRS


Temperature:96.9 


Pulse: 72 


Respiratory Rate: 18


 


Blood Pressure 141 /102 


Mean: 115





Results/Orders


Lab Results





Laboratory Tests








Test


 9/5/18


03:30 Range/Units


 


 


Urine Color BIJU H  


 


Urine Clarity


 SLIGHTLY


CLOUDY  





 


Urine pH 5  5-9  


 


Urine Specific Gravity 1.030 H 1.016-1.022  


 


Urine Protein 2+ H NEGATIVE  


 


Urine Glucose (UA) NEGATIVE  NEGATIVE  


 


Urine Ketones 1+ H NEGATIVE  


 


Urine Nitrite NEGATIVE  NEGATIVE  


 


Urine Bilirubin NEGATIVE  NEGATIVE  


 


Urine Urobilinogen 1  NORMAL  MG/DL


 


Urine Leukocyte Esterase 2+ H NEGATIVE  


 


Urine RBC (Auto) 5+ H NEGATIVE  


 


Urine RBC >100 H  /HPF


 


Urine WBC 25-50 H  /HPF


 


Urine Squamous Epithelial


Cells 2-5 


  /HPF





 


Urine Crystals NONE   /LPF


 


Urine Bacteria MODERATE H  /HPF


 


Urine Casts NONE   /LPF


 


Urine Mucus NEGATIVE   /LPF


 


Urine Culture Indicated YES   








My Orders





Orders - ANUEL OMER MD


Ua Culture If Indicated (9/5/18 03:20)


Urine Pregnancy Bedside (9/5/18 03:30)


Urine Culture (9/5/18 03:30)


Phenazopyridine Tablet (Pyridium Tablet) (9/5/18 04:00)


Cephalexin Capsule (Keflex Capsule) (9/5/18 04:00)





Vital Signs/I&O











 9/5/18





 03:32


 


Temp 96.9


 


Pulse 72


 


Resp 18


 


B/P (MAP) 141/102 (115)


 


Pulse Ox 100


 


O2 Delivery Room Air





Capillary Refill : Less Than 3 Seconds








Blood Pressure Mean:  115











Point of Care Testing


Urine Pregnancy-Bedside:  Negative


Progress Note :  


Progress Note


Patient was found to have significant urinary tract infection by urinalysis.  

Treatment was started with Keflex and Pyridium.





Departure


Impression





 Primary Impression:  


 Urinary tract infection


 Qualified Codes:  N39.0 - Urinary tract infection, site not specified; R31.9 - 

Hematuria, unspecified


 Additional Impression:  


 Pelvic pain


Disposition:  01 HOME, SELF-CARE


Condition:  Improved





Departure-Patient Inst.


Decision time for Depature:  04:01


Referrals:  


GEOFFREY LUCIA DO (PCP/Family)


Primary Care Physician


Patient Instructions:  Urinary Tract Infection, Adult (DC)





Add. Discharge Instructions:  


Drink plenty of clear liquids.  Follow-up with Dr. Lucia's office on Friday 

to review urine culture results.  You may take ibuprofen up to 6 or milligrams 

every 6 hours as needed for pain.  You may also take Tylenol (acetaminophen) up 

to 1000 mg every 6 hours as needed for additional pain relief.  Return to the 

emergency room if you have worsening symptoms.  Peridium has been prescribed 

for bladder pain.  Please be advised this medication can turn your urine a red 

or orange color.














All discharge instructions reviewed with patient and/or family. Voiced 

understanding.


Scripts


Phenazopyridine HCl (Pyridium) 200 Mg Tablet


1 TAB PO TID PRN for PAIN-MODERATE TO SEVERE, #10 TAB


   Prov: ANUEL OMER MD         9/5/18 


Cephalexin (Keflex) 500 Mg Capsule


500 MG PO QID, #28 CAP


   Prov: ANUEL OMER MD         9/5/18





Copy


Copies To 1:   GEOFFREY LUCIA JOSHUA T MD Sep 5, 2018 03:52

## 2018-09-05 NOTE — XMS REPORT
[facility] Clinical Summary

 Created on: 2015



Mirtha Machado

External Reference #: [id number]

: 1995

Sex: Female



Demographics







 Address  [address] [place], [place] [address]

 

 Home Phone  [phone]

 

 Preferred Language  English

 

 Marital Status  S

 

 Oriental orthodox Affiliation  Unknown

 

 Race  Unknown

 

 Ethnic Group  Not  or 





Author







 Author  Admin, JC

 

 Organization  [employer]

 

 Address  Unknown

 

 Phone  Unavailable







Allergies, Adverse Reactions, Alerts







 Allergy Name  Reaction Description  Start Date  Severity  Status  Provider

 

 No Known Allergies             Winter Osman MA







Conditions or Problems







 Problem Name  Problem Code  Onset Date  Status  Entry Date  Provider  Comment  
Standard Description  Annotate

 

 Irregular menses  626.4    Active    Armando Barragan MD   
  Irregular menstrual cycle   

 

 Contraceptive management  V25.9    Active    Armando Barragan MD     Encounter for unspecified contraceptive management   

 

 High-risk sexual behavior  V69.2    Active    Fernanda PEDERSON     High-risk sexual behavior   

 

 Upper respiratory infection  465.9    Active    Armando Barragan MD     Acute upper respiratory infections of unspecified site   







Medication List







 Medication  Instructions  Start Date  Stop Date  Generic Name  NDC  Status  
Provider  Patient Instruction









 CYCLAFEM  0.5/0.75/1-35 MG-MCG TABS  1 daily       NORETHIN-ETH 
ESTRAD TRIPHASIC  [id number]  Active  Armando Barragan MD     Active







Advance Directives







 Directive Description  Start Date

 

 CONSENT TO MEDICAL CARE  







Vital Signs







 Date  Name  Value  Unit  Range  Description

 

   blood pressure, diastolic - 8462-4  80  mm[Hg]     BP kapoor

 

   blood pressure, systolic - 8480-6  135  mm[Hg]     BP sys

 

   pulse rate E&M - 8867-4  88  /min     Heart rate

 

   temperature E&M  98.5  [degF]     Body temperature

 

   weight E&M - 3141-9  211  [lb_av]     Weight Measured

 

   blood pressure, diastolic - 8462-4  71  mm[Hg]     BP kapoor

 

   blood pressure, systolic - 8480-6  114  mm[Hg]     BP sys

 

   height E&M - 8302-2  67.25  [in_us]     Bdy height

 

   pulse rate E&M - 8867-4  79  /min     Heart rate

 

   temperature E&M  99  [degF]     Body temperature

 

   weight E&M - 3141-9  209  [lb_av]     Weight Measured







Diagnostic Results







 Date  Name  Value  Unit  Range  Description

 

 Lab Report: Chlamydia/GC APTIMA/06019, HIV-1/HIV-2 AB SCREEN W REFL/ ... - 
Lab 

 

   chlamydia DNA probe  NOT DETECTED     NOT DETECTED   

 

 Lab Report: Chlamydia/GC APTIMA/84516, HIV-1/HIV-2 AB SCREEN W REFL/ ... - 
Microbiology 

 

   Neisseria gonorrhoeae DNA probe  NOT DETECTED     NOT DETECTED   

 

 Lab Report: Chlamydia/GC APTIMA/92746, HIV-1/HIV-2 AB SCREEN W REFL/ ... - 
Serology 

 

   rapid plasma reagin antibody titer  NON-REACTIVE     NON-REACTIVE 
  

 

 Lab Report: [facility] [place] [facility] 

 

   human chorionic gonadotropin, urine, qualitative (urine pregnancy 
test)  Negative     Negative   







Encounters







 Code  Encounter  Date  Provider  Facility

 

 CPT-90624  Level 3 Est. Patient   15:24:11 CST  Armando Barragan MD  
[facility]

 

 CPT-66942  Level 2 New Patient   17:55:29 CST  Armando Barragan MD  
[facility]

## 2018-09-05 NOTE — XMS REPORT
[facility] Clinical Summary

 Created on: 2014



Mirtha Machado

External Reference #: [id number]

: 1995

Sex: Female



Demographics







 Address  [address] [place]  [address]

 

 Home Phone  [phone]

 

 Preferred Language  English

 

 Marital Status  S

 

 Spiritism Affiliation  Unknown

 

 Race  Unknown

 

 Ethnic Group  Not  or 





Author







 Author  Admin, JC

 

 Organization  [facility]

 

 Address  Unknown

 

 Phone  Unavailable







Allergies, Adverse Reactions, Alerts







 Allergy Name  Reaction Description  Start Date  Severity  Status  Provider

 

 Allergies Unknown               







Conditions or Problems







 Problem Name  Problem Code  Onset Date  Status  Entry Date  Provider  Comment  
Standard Description  Annotate

 

 Irregular menses  626.4    Active    Armando Barragan MD   
  Irregular menstrual cycle   

 

 Contraceptive management  V25.9    Active    Armando Barragan MD     Encounter for unspecified contraceptive management   

 

 High-risk sexual behavior  V69.2    Active    Fernanda Ferrari 
BG     High-risk sexual behavior   







Medication List







 Medication  Instructions  Start Date  Stop Date  Generic Name  NDC  Status  
Provider  Patient Instruction









 CYCLAFEM 7/7/7 0.5/0.75/1-35 MG-MCG TABS  1 daily       NORETHIN-ETH 
ESTRAD TRIPHASIC  [id number]  Active  Armando Barragan MD     Active







Advance Directives







 Directive Description  Start Date

 

 CONSENT TO MEDICAL CARE  







Diagnostic Results







 Date  Name  Value  Unit  Range  Description

 

 Lab Report: Chlamydia/GC APTIMA/55367, HIV-1/HIV-2 AB SCREEN W REFL/ ... - 
Lab 

 

   chlamydia DNA probe  NOT DETECTED     NOT DETECTED   

 

 Lab Report: Chlamydia/GC APTIMA/40395, HIV-1/HIV-2 AB SCREEN W REFL/ ... - 
Microbiology 

 

   Neisseria gonorrhoeae DNA probe  NOT DETECTED     NOT DETECTED   

 

 Lab Report: Chlamydia/GC APTIMA/09171, HIV-1/HIV-2 AB SCREEN W REFL/1972 ... - 
Serology 

 

   rapid plasma reagin antibody titer  NON-REACTIVE     NON-REACTIVE 
  

 

 [place] [facility] [place] 

 

   human chorionic gonadotropin, urine, qualitative (urine pregnancy 
test)  Negative     Negative   







Encounters







 Code  Encounter  Date  Provider  Facility

 

 CPT-01140  Level 2 New Patient   17:55:29 CST  Armando Barragan MD  
[facility]

## 2018-09-05 NOTE — XMS REPORT
[facility] Clinical Summary

 Created on: 2015



Mirtha Machado

External Reference #: [id number]

: 1995

Sex: Female



Demographics







 Address  [address] [place], [place] [address]

 

 Home Phone  [phone]

 

 Preferred Language  English

 

 Marital Status  S

 

 Baptism Affiliation  Unknown

 

 Race  Unknown

 

 Ethnic Group  Not  or 





Author







 Author  Admin, JC

 

 Organization  [employer]

 

 Address  Unknown

 

 Phone  Unavailable







Allergies, Adverse Reactions, Alerts







 Allergy Name  Reaction Description  Start Date  Severity  Status  Provider

 

 No Known Allergies             Winter Osman MA







Conditions or Problems







 Problem Name  Problem Code  Onset Date  Status  Entry Date  Provider  Comment  
Standard Description  Annotate

 

 Irregular menses  626.4    Active    Armando Barragan MD   
  Irregular menstrual cycle   

 

 Contraceptive management  V25.9    Active    Armando Barragan MD     Encounter for unspecified contraceptive management   

 

 High-risk sexual behavior  V69.2    Active    Fernanda PEDERSON     High-risk sexual behavior   

 

 Upper respiratory infection  465.9    Active    Armando Barragan MD     Acute upper respiratory infections of unspecified site   







Medication List







 Medication  Instructions  Start Date  Stop Date  Generic Name  NDC  Status  
Provider  Patient Instruction









 CYCLAFEM  0.5/0.75/1-35 MG-MCG TABS  1 daily       NORETHIN-ETH 
ESTRAD TRIPHASIC  [id number]  Active  Armando Barragan MD     Active







Advance Directives







 Directive Description  Start Date

 

 CONSENT TO MEDICAL CARE  







Vital Signs







 Date  Name  Value  Unit  Range  Description

 

   blood pressure, diastolic - 8462-4  80  mm[Hg]     BP kapoor

 

   blood pressure, systolic - 8480-6  135  mm[Hg]     BP sys

 

   pulse rate E&M - 8867-4  88  /min     Heart rate

 

   temperature E&M  98.5  [degF]     Body temperature

 

   weight E&M - 3141-9  211  [lb_av]     Weight Measured

 

   blood pressure, diastolic - 8462-4  71  mm[Hg]     BP kapoor

 

   blood pressure, systolic - 8480-6  114  mm[Hg]     BP sys

 

   height E&M - 8302-2  67.25  [in_us]     Bdy height

 

   pulse rate E&M - 8867-4  79  /min     Heart rate

 

   temperature E&M  99  [degF]     Body temperature

 

   weight E&M - 3141-9  209  [lb_av]     Weight Measured







Diagnostic Results







 Date  Name  Value  Unit  Range  Description

 

 Lab Report: Chlamydia/GC APTIMA/50970, HIV-1/HIV-2 AB SCREEN W REFL/ ... - 
Lab 

 

   chlamydia DNA probe  NOT DETECTED     NOT DETECTED   

 

 Lab Report: Chlamydia/GC APTIMA/11524, HIV-1/HIV-2 AB SCREEN W REFL/ ... - 
Microbiology 

 

   Neisseria gonorrhoeae DNA probe  NOT DETECTED     NOT DETECTED   

 

 Lab Report: Chlamydia/GC APTIMA/72532, HIV-1/HIV-2 AB SCREEN W REFL/ ... - 
Serology 

 

   rapid plasma reagin antibody titer  NON-REACTIVE     NON-REACTIVE 
  

 

 Lab Report: [facility] [place] - Chemistry 

 

   human chorionic gonadotropin, urine, qualitative (urine pregnancy 
test)  Negative     Negative   







Encounters







 Code  Encounter  Date  Provider  Facility

 

 CPT-35950  Level 3 Est. Patient   15:24:11 CST  Armando Barragan MD  
[facility]

 

 CPT-89816  Level 2 New Patient   17:55:29 CST  Armando Barragan MD  
[facility]

## 2018-09-05 NOTE — XMS REPORT
[facility] Clinical Summary

 Created on: 2014



Mirtha Machado

External Reference #: [id number]

: 1995

Sex: Female



Demographics







 Address  [address] [place], [place] [address]

 

 Home Phone  [phone]

 

 Preferred Language  English

 

 Marital Status  S

 

 Christian Affiliation  Unknown

 

 Race  Unknown

 

 Ethnic Group  Not  or 





Author







 Author  Admin, [facility]

 

 Organization  [facility]

 

 Address  Unknown

 

 Phone  Unavailable







Allergies, Adverse Reactions, Alerts







 Allergy Name  Reaction Description  Start Date  Severity  Status  Provider

 

 Allergies Unknown               







Conditions or Problems







 Problem Name  Problem Code  Onset Date  Status  Entry Date  Provider  Comment  
Standard Description  Annotate

 

 Irregular menses  626.4    Active    Armando Barragan MD   
  Irregular menstrual cycle   

 

 Contraceptive management  V25.9    Active    Armando Barragan MD     Encounter for unspecified contraceptive management   







Medication List







 Medication  Instructions  Start Date  Stop Date  Generic Name  NDC  Status  
Provider  Patient Instruction









 CYCLAFEM 7/7/7 0.5/0.75/1-35 MG-MCG TABS  1 daily       NORETHIN-ETH 
ESTRAD TRIPHASIC  [id number]  Active  Armando Barragan MD     Active







Diagnostic Results







 Date  Name  Value  Unit  Range  Description

 

 Lab Report: [facility] - Chemistry 

 

   human chorionic gonadotropin, urine, qualitative (urine pregnancy 
test)  Negative     Negative   







Encounters







 Code  Encounter  Date  Provider  Facility

 

 CPT-89694  Level 2 New Patient   17:55:29 CST  Armando Barragan MD  
[facility]

## 2018-09-05 NOTE — XMS REPORT
[facility]

 Created on: 2018



Mirtha Machado

External Reference #: [id number]

: 1995

Sex: Female



Demographics







 Address  [address] [place]  [address]

 

 Preferred Language  Unknown

 

 Marital Status  Unknown

 

 Shinto Affiliation  Unknown

 

 Race  Unknown

 

 Ethnic Group  Unknown





Author







 Author  GERI  DIONNE

 

 Organization  [facility]

 

 Address  [address] [place]  [address]



 

 Phone  [phone]







Care Team Providers







 Care Team Member Name  Role  Phone

 

 ADTAM  DIONNE  Unavailable  [phone]







PROBLEMS







 Type  Condition  ICD9-CM Code  VYE68-GL Code  Onset Dates  Condition Status  
SNOMED Code

 

 Problem  Other diseases of nasal cavity and sinuses  478.19        Active  
636818107

 

 Problem  Night terrors     F51.4     Active  89829956

 

 Problem  Allergic rhinitis, cause unspecified  477.9        Active  87734822

 

 Problem  Counseling on other sexually transmitted diseases  V65.45        
Active  961492506

 

 Problem  General counseling for prescription of oral contraceptives  V25.01   
     Active  [id number]

 

 Problem  Pregnancy examination or test, negative result  V72.41        Active  
[id number]

 

 Problem  Moderate episode of recurrent major depressive disorder     F33.1     
Active  484537098

 

 Problem  Depression, reactive     F32.9     Active  27722905

 

 Problem  Dysthymia     F34.1     Active  10075267

 

 Problem  Panic attack     F41.0     Active  114070797

 

 Problem  Anxiety     F41.9     Active  42566922

 

 Problem  Post traumatic stress disorder (PTSD)     F43.10     Active  68653008







ALLERGIES

No Information



ENCOUNTERS







 Encounter  Location  Date  Diagnosis

 

 [facility]  [address] [id number], [place] [address]    Moderate episode of recurrent major depressive disorder 
F33.1

 

 [facility]  [id number], [place] [address]    Moderate episode of recurrent major depressive disorder 
F33.1

 

 [facility]  [id number], [place] [address]
6137    Moderate episode of recurrent major depressive disorder 
F33.1

 

 [facility]  [id number], [place] [address]    Moderate episode of recurrent major depressive disorder 
F33.1

 

 [id number], [place] [address]  31 Oct, 2017  Anxiety F41.9 ; Post traumatic stress disorder (PTSD) 
F43.10 ; Panic attack F41.0 ; Dysthymia F34.1 ; Night terrors F51.4 and 
Depression, reactive F32.9

 

 [id number], [place] [address]     

 

 [id number] [address]     

 

 [id number] [place] [address]
6[address]  03 Sep, 2014   

 

 [id number] [place] [address]  03 Sep, 2014   

 

 [id number] [place] [address]     

 

 [id number], [place] [address]     







IMMUNIZATIONS

No Known Immunizations



SOCIAL HISTORY

Never Assessed



REASON FOR VISIT

med refill



PLAN OF CARE





VITAL SIGNS





MEDICATIONS







 Medication  Instructions  Dosage  Frequency  Start Date  End Date  Duration  
Status

 

 Celexa 20 MG  Orally Once a day  1 tablet  24h        30 days  Active

 

 Trazodone HCl 50 MG  Orally at bedtime as needed for sleep  0.5-1 tablet      
     30 days  Active

 

 HydrOXYzine HCl 25 MG  Orally three times a day as needed  0.5-1 tab          30 day(s)  Active







RESULTS

No Results



PROCEDURES

No Known procedures



INSTRUCTIONS





MEDICATIONS ADMINISTERED

No Known Medications



MEDICAL (GENERAL) HISTORY







 Type  Description  Date

 

 Medical History  PTSD   

 

 Medical History  depression   

 

 Medical History  insomnia

## 2018-09-05 NOTE — XMS REPORT
[facility]

 Created on: 2018



Mirtha Machado

External Reference #: [id number]

: 1995

Sex: Female



Demographics







 Address  [address] [place]  [address]

 

 Preferred Language  Unknown

 

 Marital Status  Unknown

 

 Yarsani Affiliation  Unknown

 

 Race  Unknown

 

 Ethnic Group  Unknown





Author







 Author  GERI  DIONNE

 

 [facility]

 

 Address  [address] [place]  [address]



 

 Phone  [phone]







Care Team Providers







 Care Team Member Name  Role  Phone

 

 ADTAM  DIONNE  Unavailable  [phone]







PROBLEMS







 Type  Condition  ICD9-CM Code  MMS64-PH Code  Onset Dates  Condition Status  
SNOMED Code

 

 Problem  Other diseases of nasal cavity and sinuses  478.19        Active  
205601445

 

 Problem  Night terrors     F51.4     Active  65952266

 

 Problem  Allergic rhinitis, cause unspecified  477.9        Active  80691552

 

 Problem  Counseling on other sexually transmitted diseases  V65.45        
Active  068817239

 

 Problem  General counseling for prescription of oral contraceptives  V25.01   
     Active  [id number]

 

 Problem  Pregnancy examination or test, negative result  V72.41        Active  
616065400

 

 Problem  Moderate episode of recurrent major depressive disorder     F33.1     
Active  209475985

 

 Problem  Depression, reactive     F32.9     Active  93816306

 

 Problem  Dysthymia     F34.1     Active  27784723

 

 Problem  Panic attack     F41.0     Active  229324926

 

 Problem  Anxiety     F41.9     Active  87969805

 

 Problem  Post traumatic stress disorder (PTSD)     F43.10     Active  79483261







ALLERGIES

No Known Allergies



ENCOUNTERS







 Encounter  Location  Date  Diagnosis

 

 [place] [id number], [place] [address]     

 

 [facility]  [address] [place] [address]-
8893    Moderate episode of recurrent major depressive disorder 
F33.1

 

 [facility]  [address] [place] [address]
3499    Moderate episode of recurrent major depressive disorder 
F33.1

 

 [facility]  [address] [place] [id number], [place] [address]
6414    Moderate episode of recurrent major depressive disorder 
F33.1

 

 [place] [address], [place] [address]  31 Oct, 2017  Anxiety F41.9 ; Post traumatic stress disorder (PTSD) 
F43.10 ; Panic attack F41.0 ; Dysthymia F34.1 ; Night terrors F51.4 and 
Depression, reactive F32.9

 

 [place] [address], [place] [address]
33[address]     

 

 [place] [address] [place] [id number] [place] [address]
78[address]     

 

 [place] [address] [place] [id number] [place] [address]-
2888  03 Sep, 2014   

 

 [place] [address] [place] [id number] [place] [address]
7[address]  03 Sep, 2014   

 

 [place] [address], [place] [address]     

 

 [place] [address], [place] [address]     







IMMUNIZATIONS

No Known Immunizations



SOCIAL HISTORY

Never Assessed



REASON FOR VISIT

 f/u----Homar



PLAN OF CARE







 Activity  Details









  









 Follow Up  3 Months Reason:







VITAL SIGNS







 Height  65 in  2018

 

 Weight  218 lbs  2018

 

 Heart Rate  80 bpm  2018

 

 Respiratory Rate  20   2018

 

 BMI  36.27 kg/m2  2018

 

 Blood pressure systolic  120 mmHg  2018

 

 Blood pressure diastolic  80 mmHg  2018







MEDICATIONS







 Medication  Instructions  Dosage  Frequency  Start Date  End Date  Duration  
Status

 

 Claritin 10 mg     1 tablet by Oral route 1 time per day          
   Not-Taking

 

 Trazodone HCl 50 MG  Orally at bedtime as needed for sleep  0.5-1 tablet     
     30 day(s)  Active

 

 Celexa 10 MG  Orally Once a day  1 tablet  24h       30 days  
Active

 

 Levonorgestrel-Ethinyl Estrad 0.1-20 mg-mcg     1 tablet by Oral route 1 time 
per day     03 Sep, 2014        Not-Taking

 

 Flonase 50 mcg/actuation     1 sprays by Nasal route 2 times per day in each 
nostril             Not-Taking







RESULTS

No Results



PROCEDURES

No Known procedures



INSTRUCTIONS





MEDICATIONS ADMINISTERED

No Known Medications



MEDICAL (GENERAL) HISTORY







 Type  Description  Date

 

 Medical History  PTSD   

 

 Medical History  depression   

 

 Medical History  insomnia

## 2018-09-18 ENCOUNTER — HOSPITAL ENCOUNTER (EMERGENCY)
Dept: HOSPITAL 75 - ER | Age: 23
Discharge: HOME | End: 2018-09-18
Payer: MEDICAID

## 2018-09-18 VITALS — HEIGHT: 68 IN | WEIGHT: 226 LBS | BODY MASS INDEX: 34.25 KG/M2

## 2018-09-18 VITALS — DIASTOLIC BLOOD PRESSURE: 74 MMHG | SYSTOLIC BLOOD PRESSURE: 102 MMHG

## 2018-09-18 DIAGNOSIS — F17.210: ICD-10-CM

## 2018-09-18 DIAGNOSIS — Z79.52: ICD-10-CM

## 2018-09-18 DIAGNOSIS — F32.9: ICD-10-CM

## 2018-09-18 DIAGNOSIS — F43.10: ICD-10-CM

## 2018-09-18 DIAGNOSIS — J06.9: Primary | ICD-10-CM

## 2018-09-18 DIAGNOSIS — F41.9: ICD-10-CM

## 2018-09-18 DIAGNOSIS — R05: ICD-10-CM

## 2018-09-18 PROCEDURE — 71046 X-RAY EXAM CHEST 2 VIEWS: CPT

## 2018-09-18 NOTE — DIAGNOSTIC IMAGING REPORT
CLINICAL INDICATION: Patient with cough and trouble breathing

this morning.



EXAM: Chest x-ray PA and lateral views.



COMPARISONS: None.



FINDINGS:



Lungs/pleura: Lungs are clear. There is no pneumothorax. There is

no pleural effusion.



Mediastinum: Unremarkable. 



Pulmonary vasculature: Unremarkable.



Heart: Unremarkable.



Bones/extrathoracic soft tissue: Unremarkable.



IMPRESSION:

There is no radiographic evidence of acute cardiopulmonary

process.



Dictated by: 



  Dictated on workstation # [id number]

## 2018-09-18 NOTE — ED COUGH/URI
General


Chief Complaint:  Cough/Cold/Flu Symptoms


Stated Complaint:  COUGH/SOA


Nursing Triage Note:  


AMBULATED TO TRIAGE WITHOUT DIFFICULTY.  COMPLAINS OF COUGH X 1.5 WEEKS ET 


STATES A COWORKER WAS DX WITH BRONCHITIS.


Source:  patient


Exam Limitations:  no limitations





History of Present Illness


Date Seen by Provider:  Sep 18, 2018


Time Seen by Provider:  11:07


Initial Comments


Patient is a 22-year-old female who presents to the emergency room with 

complaints of a cough for one and half weeks. She reports that a coworker has 

had bronchitis and she thinks she has contracted it. She reports that it hurts 

to take a deep breath and she's had lots of sinus drainage.


Timing/Duration:  other (1.5 weeks)


Severity/Quality:  productive cough


Prior Episodes/Possible Cause:  no prior episodes


Associated Symptoms:  cough, nasal congestion, sore throat





Allergies and Home Medications


Allergies


Coded Allergies:  


     No Known Drug Allergies (Unverified , 09)





Home Medications


Azithromycin 250 Mg Tablet, 250 MG PO UD


   TAKE 2 TABLETS TODAY, THEN TAKE 1 TABLET DAILY FOR 4 MORE DAYS 


   Prescribed by: ANSHU THAKUR on 18 1120


Prednisone 10 Mg Tab.ds.pk, 10 MG PO UD


   Prescribed by: ANSHU THAKUR on 18 1120





Patient Home Medication List


Home Medication List Reviewed:  Yes





Review of Systems


Review of Systems


Constitutional:  see HPI; No chills, No fever


EENTM:  see HPI, nose congestion


Respiratory:  no symptoms reported; No cough; other (pain with deep breathing)





All Other Systems Reviewed


Negative Unless Noted:  Yes





Past Medical-Social-Family Hx


Past Med/Social Hx:  Reviewed Nursing Past Med/Soc Hx


Patient Social History


Alcohol Use:  Denies Use


Recreational Drug Use:  No


Smoking Status:  Current Everyday Smoker


Type Used:  Cigarettes


2nd Hand Smoke Exposure:  No


Recent Foreign Travel:  No


Contact w/Someone Who Travel:  No


Recent Infectious Disease Expo:  No


Recent Hopitalizations:  No





Immunizations Up To Date


Tetanus Booster (TDap):  Unknown





Seasonal Allergies


Seasonal Allergies:  No





Past Medical History


Surgeries:  No


Respiratory:  No


Cardiac:  No


Neurological:  No


Pregnant:  No


Last Menstrual Period:  Sep 1, 2018


Genitourinary:  No


Gastrointestinal:  No


Musculoskeletal:  No


Endocrine:  No


HEENT:  No


Cancer:  No


Psychosocial:  Yes


Sleep Difficulties, Anxiety, PTSD, Depression


Integumentary:  No


Blood Disorders:  No


Adverse Reaction/Blood Tranf:  No





Family Medical History


Reviewed Nursing Family Hx





Physical Exam





Vital Signs - First Documented








 18





 10:15


 


Temp 98.4


 


Pulse 89


 


Resp 16


 


B/P (MAP) 102/68 (79)


 


Pulse Ox 96


 


O2 Delivery Room Air





Capillary Refill : Less Than 3 Seconds


Height: 5'8.00"


Weight: 226lbs. oz. 102.663605zn;  BMI


Method:Stated


General Appearance:  WD/WN, no apparent distress


Eyes:  Bilateral Eye Normal Inspection, Bilateral Eye PERRL, Bilateral Eye EOMI


HEENT:  PERRL/EOMI, normal ENT inspection, TMs normal, pharynx normal


Respiratory:  chest non-tender, lungs clear, normal breath sounds, no 

respiratory distress, no accessory muscle use


Cardiovascular:  normal peripheral pulses, regular rate, rhythm, no edema, no 

gallop, no JVD, no murmur


Neurologic/Psychiatric:  alert, normal mood/affect, oriented x 3


Skin:  normal color, warm/dry





Progress/Results/Core Measures


Suspected Sepsis


Recent Fever Within 48 Hours:  No


Infection Criteria Present:  None


New/Unexplained  Altered Menta:  No


Sepsis Screen:  No Definite Risk


SIRS


Temperature:98.4 


Pulse: 89 


Respiratory Rate: 16


 


Blood Pressure 102 /68 


Mean: 79





Results/Orders


My Orders





Orders - ANSHU THAKUR


Chest Pa/Lat (2 View) (18 11:06)





Vital Signs/I&O











 18





 10:15 11:00 12:10


 


Temp 98.4  98.4


 


Pulse 89  63


 


Resp 16  16


 


B/P (MAP) 102/68 (79)  102/74 (79)


 


Pulse Ox 96  99


 


O2 Delivery Room Air Room Air Room Air





Capillary Refill : Less Than 3 Seconds








Blood Pressure Mean:  79








Progress Note :  


   Time:  11:50


Progress Note


I have seen and evaluated the patient. I have elected to treat with abx due to 

the length of symptoms and no improvement over this time. The patient agrees 

with plans for discharge, return precautions were given.





Diagnostic Imaging





   Diagonstic Imaging:  Xray


   Plain Films/CT/US/NM/MRI:  chest


Comments


NAME:   MARIE BARNETT


[facility] REC#:   [id number]


ACCOUNT#:   [id number]


PT STATUS:   REG ER


:   1995


PHYSICIAN:   ANSHU THAKURP


ADMIT DATE:   18/ER


 ***Draft***


Date of Exam:18





CHEST PA/LAT (2 VIEW)








CLINICAL INDICATION: Patient with cough and trouble breathing


this morning.





EXAM: Chest x-ray PA and lateral views.





COMPARISONS: None.





FINDINGS:





Lungs/pleura: Lungs are clear. There is no pneumothorax. There is


no pleural effusion.





Mediastinum: Unremarkable. 





Pulmonary vasculature: Unremarkable.





Heart: Unremarkable.





Bones/extrathoracic soft tissue: Unremarkable.





IMPRESSION:


There is no radiographic evidence of acute cardiopulmonary


process.





  Dictated on workstation # [id number]








Dict:   18 1146


Trans:   18 1148


 [id number]





Interpreted by:     ADILIA PASCAL MD


Electronically signed by:





Departure


Impression





 Primary Impression:  


 Upper respiratory infection


 Additional Impression:  


 Cough


Disposition:  01 HOME, SELF-CARE


Condition:  Stable/Unchanged





Departure-Patient Inst.


Decision time for Depature:  11:18


Referrals:  


GEOFFREY LUCIA DO (PCP/Family)


Primary Care Physician


Patient Instructions:  Bacterial Upper Respiratory Infection, Adult (DC)





Add. Discharge Instructions:  


Take medication as directed. You may use over-the-counter cough cold and flu 

medications to alleviate some symptoms. Follow-up with Dr. Lucia within 1 

week for recheck. Return back to the emergency room for any worsening symptoms 

or concerns as needed. All discharge instructions reviewed with patient and/or 

family. Voiced understanding.


Scripts


Prednisone (Prednisone) 10 Mg Tab.ds.pk


10 MG PO UD, #1 PKG


   Prov: ANSHU THAKUR         18 


Azithromycin (Zithromax) 250 Mg Tablet


250 MG PO UD, #6 TAB


   TAKE 2 TABLETS TODAY, THEN TAKE 1 TABLET DAILY FOR 4 MORE DAYS


   Prov: ANSHU THAKUR         18











ANSHU THAKUR Sep 18, 2018 11:10

## 2018-09-18 NOTE — XMS REPORT
Continuity of Care Document

 Created on: 2018



MARIE BARNETT

External Reference #: [id number]

: 1995

Sex: Female



Demographics







 Address  [address]  [address]

 

 Home Phone  [phone] x

 

 Preferred Language  Unknown

 

 Marital Status  Unknown

 

 Jainism Affiliation  Unknown

 

 Race  Unknown

 

 Ethnic Group  Unknown





Author







 Author  [facility]

 

 Address  Unknown

 

 Phone  Unavailable



              



Allergies

      





 Active            Description            Code            Type            
Severity            Reaction            Onset            Reported/Identified   
         Relationship to Patient            Clinical Status        

 

 Yes            No Known Drug Allergies            [id number]            Drug 
Allergy            Mild            N/A                         2009      
                            



                  



Medications

      



There is no data.                  



Problems

      





 Date Dx Coded            Attending            Type            Code            
Diagnosis            Diagnosed By        

 

 2014            JOSE ELIAS LAWTON                         
477.9            ALLERGIC RHINITIS CAUSE UNSPECIFIED                     

 

 2014            JOSE ELIAS LAWTON N                         
478.19            OTHER DISEASES OF NASAL CAVITY AND SINUSES                   
  

 

 2014            ANJU SPRAGUE A                         477.9    
        ALLERGIC RHINITIS CAUSE UNSPECIFIED                     

 

 2014            ANJU SPRAGUE A                         478.19   
         OTHER DISEASES OF NASAL CAVITY AND SINUSES                     

 

 2014            ANJU SPRAGUE A                         V25.01   
         CONTRACEPTION - ORAL CONTRACEPTION                     

 

 2014            ANJU SPRAGUE A                         V65.45   
         STD COUNSELING                     

 

 2014            ANJU SPRAGUE A                         V72.41   
         PREGNANCY TEST NEGATIVE RESULT                     

 

 06/10/2018            TABBY GARCIAP            Ot            F17.210         
   NICOTINE DEPENDENCE, CIGARETTES, UNCOMPL                     

 

 06/10/2018            TABBY GARCIAP            Ot            F32.9            
MAJOR DEPRESSIVE DISORDER, SINGLE EPISOD                     

 

 06/10/2018            TABBY GARCIA ARNP            Ot            F41.9            
ANXIETY DISORDER, UNSPECIFIED                     

 

 06/10/2018            TABBY GARCIAP            Ot            G47.9            
SLEEP DISORDER, UNSPECIFIED                     

 

 06/10/2018            TABBY GARCIA ARNP            Ot            K62.89          
  OTHER SPECIFIED DISEASES OF ANUS AND REC                     

 

 06/10/2018            TABBY GARCIAP            Ot            K64.9            
UNSPECIFIED HEMORRHOIDS                     

 

 2018            TABBY GARCIA ARNP            Ot            F17.210         
   NICOTINE DEPENDENCE, CIGARETTES, UNCOMPL                     

 

 2018            RADHA TABBY ARNP            Ot            F32.9            
MAJOR DEPRESSIVE DISORDER, SINGLE EPISOD                     

 

 2018            RADHA TABBY ARNP            Ot            F41.9            
ANXIETY DISORDER, UNSPECIFIED                     

 

 2018            RADHA, TABBY ARNP            Ot            G47.9            
SLEEP DISORDER, UNSPECIFIED                     

 

 2018            RADHA, TABBY ARNP            Ot            K62.89          
  OTHER SPECIFIED DISEASES OF ANUS AND REC                     

 

 2018            RADHA, TABBY ARNP            Ot            K64.9            
UNSPECIFIED HEMORRHOIDS                     

 

 2018            RADHA, TABBY ARNP            Ot            F17.210         
   NICOTINE DEPENDENCE, CIGARETTES, UNCOMPL                     

 

 2018            RADHA, TABBY ARNP            Ot            F32.9            
MAJOR DEPRESSIVE DISORDER, SINGLE EPISOD                     

 

 2018            RADHA, TABBY ARNP            Ot            F41.9            
ANXIETY DISORDER, UNSPECIFIED                     

 

 2018            RADHA, TABBY ARNP            Ot            G47.9            
SLEEP DISORDER, UNSPECIFIED                     

 

 2018            RADHA, TABBY ARNP            Ot            K62.89          
  OTHER SPECIFIED DISEASES OF ANUS AND REC                     

 

 2018            RADHA, TABBY ARNP            Ot            K64.9            
UNSPECIFIED HEMORRHOIDS                     

 

 2018            KAN SOLORIO, ANUEL HO            Ot            
F17.210            NICOTINE DEPENDENCE, CIGARETTES, UNCOMPL                     

 

 2018            KAN SOLORIO, ANUEL HO            Ot            F32.9 
           MAJOR DEPRESSIVE DISORDER, SINGLE EPISOD                     

 

 2018            KAN SOLORIO, ANUEL HO            Ot            F41.9 
           ANXIETY DISORDER, UNSPECIFIED                     

 

 2018            KAN SOLORIO, ANUEL HO            Ot            F43.10
            POST-TRAUMATIC STRESS DISORDER, UNSPECIF                     

 

 2018            KAN SOLORIO, ANUEL HO            Ot            N39.0 
           URINARY TRACT INFECTION, SITE NOT SPECIF                     

 

 2018            ANUEL OMER MD            Ot            R10.2 
           PELVIC AND PERINEAL PAIN                     



                                                                              



Procedures

      





 Code            Description            Performed By            Performed On   
     

 

             44507                                  PREGNANCY TEST, URINE (IN-
HOUSE)                                   2014        

 

             99498                                  STREP A  (IN-HOUSE)        
                           2014        

 

             61247                                  PREGNANCY TEST, URINE (IN-
HOUSE)                                   2014        



                      



Results

      





 Test            Result            Range        









 Complete urinalysis with reflex to culture - 06/10/18 16:49         









 Urine color determination            YELLOW             NRG        

 

 Urine clarity determination            VERY CLOUDY             NRG        

 

 Urine pH measurement by test strip            7             5-9        

 

 Specific gravity of urine by test strip            1.010             1.016-
1.022        

 

 Urine protein assay by test strip, semi-quantitative            NEGATIVE      
       NEGATIVE        

 

 Urine glucose detection by automated test strip            NEGATIVE           
  NEGATIVE        

 

 Erythrocytes detection in urine sediment by light microscopy            3+    
         NEGATIVE        

 

 Urine ketones detection by automated test strip            NEGATIVE           
  NEGATIVE        

 

 Urine nitrite detection by test strip            NEGATIVE             NEGATIVE
        

 

 Urine total bilirubin detection by test strip            NEGATIVE             
NEGATIVE        

 

 Urine urobilinogen measurement by automated test strip (mass/volume)          
  NORMAL             NORMAL        

 

 Urine leukocyte esterase detection by dipstick            3+             
NEGATIVE        

 

 Automated urine sediment erythrocyte count by microscopy (number/high power 
field)            RARE             NRG        

 

 Automated urine sediment leukocyte count by microscopy (number/high power field
)             [HPF]            NRG        

 

 Bacteria detection in urine sediment by light microscopy            NEGATIVE  
           NRG        

 

 Squamous epithelial cells detection in urine sediment by light microscopy     
       10-25             NRG        

 

 Crystals detection in urine sediment by light microscopy            PRESENT   
          NRG        

 

 Casts detection in urine sediment by light microscopy            NONE         
    NRG        

 

 Mucus detection in urine sediment by light microscopy            NEGATIVE     
        NRG        

 

 Complete urinalysis with reflex to culture            NO             NRG      
  

 

 Amorphous sediment detection in urine sediment by light microscopy            
LARGE EMILIANA URATES             NRG        

 

 Renal epithelial cells detection in urine sediment by light microscopy        
    NONE             NRG        









 Complete urinalysis with reflex to culture - 18 03:30         









 Urine color determination            BIJU             NRG        

 

 Urine clarity determination            SLIGHTLY CLOUDY             NRG        

 

 Urine pH measurement by test strip            5             5-9        

 

 Specific gravity of urine by test strip            1.030             1.016-
1.022        

 

 Urine protein assay by test strip, semi-quantitative            2+             
NEGATIVE        

 

 Urine glucose detection by automated test strip            NEGATIVE           
  NEGATIVE        

 

 Erythrocytes detection in urine sediment by light microscopy            5+    
         NEGATIVE        

 

 Urine ketones detection by automated test strip            1+             
NEGATIVE        

 

 Urine nitrite detection by test strip            NEGATIVE             NEGATIVE
        

 

 Urine total bilirubin detection by test strip            NEGATIVE             
NEGATIVE        

 

 Urine urobilinogen measurement by automated test strip (mass/volume)          
  1 mg/dL            NORMAL        

 

 Urine leukocyte esterase detection by dipstick            2+             
NEGATIVE        

 

 Automated urine sediment erythrocyte count by microscopy (number/high power 
field)            > [HPF]            NRG        

 

 Automated urine sediment leukocyte count by microscopy (number/high power field
)             [HPF]            NRG        

 

 Bacteria detection in urine sediment by light microscopy            MODERATE  
           NRG        

 

 Squamous epithelial cells detection in urine sediment by light microscopy     
       2-5             NRG        

 

 Crystals detection in urine sediment by light microscopy            NONE      
       NRG        

 

 Casts detection in urine sediment by light microscopy            NONE         
    NRG        

 

 Mucus detection in urine sediment by light microscopy            NEGATIVE     
        NRG        

 

 Complete urinalysis with reflex to culture            YES             NRG     
   









 Bacterial urine culture - 18 03:30         









 Bacterial urine culture            SEE COMMEN             NRG        

 

 COLONY COUNT            .             NRG        



                    



Encounters

      





 ACCT No.            Visit Date/Time            Discharge            Status    
        Pt. Type            Provider            Facility            Loc./Unit  
          Complaint        

 

 [id number]            2014 14:24:00            2014 23:59:59          
  [facility]            Outpatient            KEVIN BERNALSTANISLAVANJU RAVEN                  
                             

 

 [id number]            2014 13:39:00            2014 23:59:59          
  [facility]            Outpatient            JOSE ELIAS LAWTON STANISLAV           
                                    

 

 [id number]            2018 03:18:00            2018 04:10:00    
        DIS            Outpatient            ANUEL OMER MD          
  [facility]            ER            PELVIC PAIN        

 

 [id number]            06/10/2018 16:12:00            06/10/2018 17:40:00    
        DIS            Emergency            TABBY GARCIA            [facility]            ER            RECTAL BLEEDING        

 

 [id number]            2013 15:20:00            2013 23:59:59    
        [facility]            Outpatient                                              
              

 

 [id number]            2018 08:20:00            2018 23:59:59            
CL[facility]            Outpatient            NIDIA WILD LAC                         
[facility]                     

 

 [id number]            2018 10:10:34                         ACT            
Unknown

## 2018-09-18 NOTE — XMS REPORT
[facility]

 Created on: 2018



Mirtha Machado

External Reference #: [id number]

: 1995

Sex: Female



Demographics







 Address  [address] [place]  [address]

 

 Preferred Language  Unknown

 

 Marital Status  Unknown

 

 Oriental orthodox Affiliation  Unknown

 

 Race  Unknown

 

 Ethnic Group  Unknown





Author







 Author  GERI  DIONNE

 

 [facility]

 

 Address  [address] [place]  [address]



 

 Phone  [phone]







Care Team Providers







 Care Team Member Name  Role  Phone

 

 ADTAM  DIONNE  Unavailable  [phone]







PROBLEMS







 Type  Condition  ICD9-CM Code  OLN13-HQ Code  Onset Dates  Condition Status  
SNOMED Code

 

 Problem  Other diseases of nasal cavity and sinuses  478.19        Active  
168756024

 

 Problem  Night terrors     F51.4     Active  83543594

 

 Problem  Allergic rhinitis, cause unspecified  477.9        Active  81257187

 

 Problem  Counseling on other sexually transmitted diseases  V65.45        
Active  224801869

 

 Problem  General counseling for prescription of oral contraceptives  V25.01   
     Active  [id number]

 

 Problem  Pregnancy examination or test, negative result  V72.41        Active  
116568654

 

 Problem  Moderate episode of recurrent major depressive disorder     F33.1     
Active  950510413

 

 Problem  Depression, reactive     F32.9     Active  80413639

 

 Problem  Dysthymia     F34.1     Active  28144325

 

 Problem  Panic attack     F41.0     Active  779535088

 

 Problem  Anxiety     F41.9     Active  86490618

 

 Problem  Post traumatic stress disorder (PTSD)     F43.10     Active  00247220







ALLERGIES

No Information



ENCOUNTERS







 Encounter  Location  Date  Diagnosis

 

 [place] [address] [place] [address] [phone]
[phone]  03 Dec, 2018   

 

 [facility]  [id number] [place] [address] [phone]
[phone]  05 Sep, 2018  Moderate episode of recurrent major depressive disorder 
F33.1

 

 [facility]  [id number] [place] [address] [phone]
[phone]    Moderate episode of recurrent major depressive disorder 
F33.1

 

 [facility]  [address] [place] [address] [phone]-
[phone]    Moderate episode of recurrent major depressive disorder 
F33.1

 

 [facility]  [address] [place] [address] [phone]
[phone]    Moderate episode of recurrent major depressive disorder 
F33.1

 

 [place] [id number] [place] [address] [phone]
[phone]    Moderate episode of recurrent major depressive disorder 
F33.1

 

 [place] [id number] [place] [address] [phone]-
[phone]  31 Oct, 2017  Anxiety F41.9 ; Post traumatic stress disorder (PTSD) 
F43.10 ; Panic attack F41.0 ; Dysthymia F34.1 ; Night terrors F51.4 and 
Depression, reactive F32.9

 

 [place] [id number] [place] [address] [phone]-
[phone]     

 

 [place] [id number] [place] [address] [phone]-
[phone]     

 

 [place] [id number] [place] [address] [phone]
[phone]  03 Sep, 2014   

 

 [place] [id number] [place] [address] [phone]
[phone]  03 Sep, 2014   

 

 [place] [id number] [place] [address] [phone]-
[phone]     

 

 [place] [id number] [place] [address] [phone]
[phone]     







IMMUNIZATIONS

No Known Immunizations



SOCIAL HISTORY

Never Assessed



REASON FOR VISIT

 f/u Yayo Lemons MA



PLAN OF CARE







 Activity  Details









  









 Follow Up  4 Weeks, prn Reason:







VITAL SIGNS







 Height  65 in  2018

 

 Weight  223.8 lbs  2018

 

 Heart Rate  70 bpm  2018

 

 Respiratory Rate  18   2018

 

 BMI  37.24 kg/m2  2018

 

 Blood pressure systolic  110 mmHg  2018

 

 Blood pressure diastolic  78 mmHg  2018







MEDICATIONS







 Medication  Instructions  Dosage  Frequency  Start Date  End Date  Duration  
Status

 

 Levonorgestrel-Ethinyl Estrad 0.1-20 mg-mcg     1 tablet by Oral route 1 time 
per day     03 Sep, 2014        Not-Taking

 

 Claritin 10 mg     1 tablet by Oral route 1 time per day          
   Not-Taking

 

 Flonase 50 mcg/actuation     1 sprays by Nasal route 2 times per day in each 
nostril             Not-Taking

 

 HydrOXYzine HCl 25 MG  Orally three times a day as needed  0.5-1 tab          30 day(s)  Active

 

 Celexa 20 MG  Orally Once a day  1 tablet  24h        30 days  Active

 

 Trazodone HCl 50 MG  Orally at bedtime as needed for sleep  0.5-1 tablet      
     30 days  Active







RESULTS

No Results



PROCEDURES

No Known procedures



INSTRUCTIONS





MEDICATIONS ADMINISTERED

No Known Medications



MEDICAL (GENERAL) HISTORY







 Type  Description  Date

 

 Medical History  PTSD   

 

 Medical History  depression   

 

 Medical History  insomnia

## 2018-11-13 ENCOUNTER — HOSPITAL ENCOUNTER (EMERGENCY)
Dept: HOSPITAL 75 - ER | Age: 23
LOS: 1 days | Discharge: HOME | End: 2018-11-14
Payer: MEDICAID

## 2018-11-13 VITALS — HEIGHT: 68 IN | BODY MASS INDEX: 36.37 KG/M2 | WEIGHT: 240 LBS

## 2018-11-13 DIAGNOSIS — N94.6: Primary | ICD-10-CM

## 2018-11-13 DIAGNOSIS — Z79.52: ICD-10-CM

## 2018-11-13 DIAGNOSIS — F32.9: ICD-10-CM

## 2018-11-13 DIAGNOSIS — F43.10: ICD-10-CM

## 2018-11-13 DIAGNOSIS — F17.210: ICD-10-CM

## 2018-11-13 DIAGNOSIS — F41.9: ICD-10-CM

## 2018-11-13 LAB
APTT PPP: YELLOW S
BACTERIA #/AREA URNS HPF: (no result) /HPF
BILIRUB UR QL STRIP: NEGATIVE
FIBRINOGEN PPP-MCNC: (no result) MG/DL
GLUCOSE UR STRIP-MCNC: NEGATIVE MG/DL
KETONES UR QL STRIP: NEGATIVE
LEUKOCYTE ESTERASE UR QL STRIP: (no result)
NITRITE UR QL STRIP: NEGATIVE
PH UR STRIP: 7 [PH] (ref 5–9)
PROT UR QL STRIP: NEGATIVE
RBC #/AREA URNS HPF: >100 /HPF
SP GR UR STRIP: 1.01 (ref 1.02–1.02)
SQUAMOUS #/AREA URNS HPF: (no result) /HPF
UROBILINOGEN UR-MCNC: NORMAL MG/DL
WBC #/AREA URNS HPF: (no result) /HPF

## 2018-11-13 PROCEDURE — 84703 CHORIONIC GONADOTROPIN ASSAY: CPT

## 2018-11-13 PROCEDURE — 81000 URINALYSIS NONAUTO W/SCOPE: CPT

## 2018-11-13 PROCEDURE — 99283 EMERGENCY DEPT VISIT LOW MDM: CPT

## 2018-11-13 NOTE — XMS REPORT
[facility]

 Created on: 2018



Mirtha Machado

External Reference #: [id number]

: 1995

Sex: Female



Demographics







 Address  [address] [place]  [address]

 

 Preferred Language  Unknown

 

 Marital Status  Unknown

 

 Nondenominational Affiliation  Unknown

 

 Race  Unknown

 

 Ethnic Group  Unknown





Author







 Author  GERI  DIONNE

 

 [facility]

 

 Address  [address] [place]  [address]



 

 Phone  [phone]







Care Team Providers







 Care Team Member Name  Role  Phone

 

 ADTAM  DIONNE  Unavailable  [phone]







PROBLEMS







 Type  Condition  ICD9-CM Code  FXQ40-WF Code  Onset Dates  Condition Status  
SNOMED Code

 

 Problem  Other diseases of nasal cavity and sinuses  478.19        Active  
956276494

 

 Problem  Night terrors     F51.4     Active  27351434

 

 Problem  Allergic rhinitis, cause unspecified  477.9        Active  37563063

 

 Problem  Counseling on other sexually transmitted diseases  V65.45        
Active  858417940

 

 Problem  General counseling for prescription of oral contraceptives  V25.01   
     Active  [id number]

 

 Problem  Pregnancy examination or test, negative result  V72.41        Active  
905673183

 

 Problem  Moderate episode of recurrent major depressive disorder     F33.1     
Active  422237999

 

 Problem  Depression, reactive     F32.9     Active  11784173

 

 Problem  Dysthymia     F34.1     Active  40917700

 

 Problem  Panic attack     F41.0     Active  024464764

 

 Problem  Anxiety     F41.9     Active  15930191

 

 Problem  Post traumatic stress disorder (PTSD)     F43.10     Active  99201823







ALLERGIES

No Information



ENCOUNTERS







 Encounter  Location  Date  Diagnosis

 

 [place] [address] [place] [address] [phone]
[phone]  03 Dec, 2018   

 

 [facility]  [id number] [place] [address] [phone]
[phone]  05 Sep, 2018  Moderate episode of recurrent major depressive disorder 
F33.1

 

 [facility]  [id number] [place] [address] [phone]
[phone]    Moderate episode of recurrent major depressive disorder 
F33.1

 

 [facility]  [address] [place] [address] [phone]-
[phone]    Moderate episode of recurrent major depressive disorder 
F33.1

 

 [facility]  [address] [phone]
[phone]    Moderate episode of recurrent major depressive disorder 
F33.1

 

 [place] [id number] [place] [address] [phone]
[phone]    Moderate episode of recurrent major depressive disorder 
F33.1

 

 [place] [id number] [place] [address] [phone]-
[phone]  31 Oct, 2017  Anxiety F41.9 ; Post traumatic stress disorder (PTSD) 
F43.10 ; Panic attack F41.0 ; Dysthymia F34.1 ; Night terrors F51.4 and 
Depression, reactive F32.9

 

 [place] [id number] [place] [address] [phone]-
[phone]     

 

 [place] [id number] [place] [address] [phone]-
[phone]     

 

 [place] [id number] [place] [address] [phone]
[phone]  03 Sep, 2014   

 

 [place] [id number] [place] [address] [phone]-
[phone]  03 Sep, 2014   

 

 [place] [id number] [place] [address] [phone]
[phone]     

 

 [place] [address] [place] [address] [phone]-
[phone]     







IMMUNIZATIONS

No Known Immunizations



SOCIAL HISTORY

Never Assessed



REASON FOR VISIT

Psychiatric f/u- AB/MA



PLAN OF CARE







 Activity  Details









  









 Follow Up  3 Months Reason:







VITAL SIGNS







 Height  65 in  2018

 

 Weight  226.3 lbs  2018

 

 Heart Rate  70 bpm  2018

 

 Respiratory Rate  16   2018

 

 BMI  37.65 kg/m2  2018

 

 Blood pressure systolic  110 mmHg  2018

 

 Blood pressure diastolic  70 mmHg  2018







MEDICATIONS







 Medication  Instructions  Dosage  Frequency  Start Date  End Date  Duration  
Status

 

 HydrOXYzine HCl 25 MG  Orally three times a day as needed  0.5-1 tab          30 days  Active

 

 Doxepin HCl 10 MG  Orally Once a day  1 capsule at bedtime  24h  05 Sep, 2018 
    30 day(s)  Active

 

 Celexa 20 MG  Orally Once a day  1 tablet  24h        30 days  Active







RESULTS

No Results



PROCEDURES

No Known procedures



INSTRUCTIONS





MEDICATIONS ADMINISTERED

No Known Medications



MEDICAL (GENERAL) HISTORY







 Type  Description  Date

 

 Medical History  PTSD   

 

 Medical History  depression   

 

 Medical History  insomnia

## 2018-11-13 NOTE — XMS REPORT
Continuity of Care Document

 Created on: 2018



MARIE BARNETT

External Reference #: [id number]

: 1995

Sex: Female



Demographics







 Address  [address], [place]  [address]

 

 Home Phone  [phone] x

 

 Preferred Language  Unknown

 

 Marital Status  Unknown

 

 Zoroastrianism Affiliation  Unknown

 

 Race  Unknown

 

 Ethnic Group  Unknown





Author







 Author  [facility]

 

 Address  Unknown

 

 Phone  Unavailable



              



Allergies

      





 Active            Description            Code            Type            
Severity            Reaction            Onset            Reported/Identified   
         Relationship to Patient            Clinical Status        

 

 Yes            No Known Drug Allergies            [id number]            Drug 
Allergy            Mild            N/A                         2009      
                            



                  



Medications

      



There is no data.                  



Problems

      





 Date Dx Coded            Attending            Type            Code            
Diagnosis            Diagnosed By        

 

 2014            JOSE ELIAS LAWTON                         
477.9            ALLERGIC RHINITIS CAUSE UNSPECIFIED                     

 

 2014            JOSE ELIAS LAWTON N                         
478.19            OTHER DISEASES OF NASAL CAVITY AND SINUSES                   
  

 

 2014            ANJU SPRAGUE A                         477.9    
        ALLERGIC RHINITIS CAUSE UNSPECIFIED                     

 

 2014            ANJU SPRAGUE A                         478.19   
         OTHER DISEASES OF NASAL CAVITY AND SINUSES                     

 

 2014            ANJU SPRAGUE A                         V25.01   
         CONTRACEPTION - ORAL CONTRACEPTION                     

 

 2014            ANJU SPRAGUE A                         V65.45   
         STD COUNSELING                     

 

 2014            ANJU SPRAGUE A                         V72.41   
         PREGNANCY TEST NEGATIVE RESULT                     

 

 06/10/2018            TABBY GARCIAP            Ot            F17.210         
   NICOTINE DEPENDENCE, CIGARETTES, UNCOMPL                     

 

 06/10/2018            TABBY GARCIAP            Ot            F32.9            
MAJOR DEPRESSIVE DISORDER, SINGLE EPISOD                     

 

 06/10/2018            TABBY GARCIA ARNP            Ot            F41.9            
ANXIETY DISORDER, UNSPECIFIED                     

 

 06/10/2018            TABBY GARCIAP            Ot            G47.9            
SLEEP DISORDER, UNSPECIFIED                     

 

 06/10/2018            TABBY GARCIA ARNP            Ot            K62.89          
  OTHER SPECIFIED DISEASES OF ANUS AND REC                     

 

 06/10/2018            TABBY GARCIAP            Ot            K64.9            
UNSPECIFIED HEMORRHOIDS                     

 

 2018            TABBY GARCIA ARNP            Ot            F17.210         
   NICOTINE DEPENDENCE, CIGARETTES, UNCOMPL                     

 

 2018            RADHA TABBY ARNP            Ot            F32.9            
MAJOR DEPRESSIVE DISORDER, SINGLE EPISOD                     

 

 2018            RADHA TABBY ARNP            Ot            F41.9            
ANXIETY DISORDER, UNSPECIFIED                     

 

 2018            RADHA, TABBY ARNP            Ot            G47.9            
SLEEP DISORDER, UNSPECIFIED                     

 

 2018            RADHA, TABBY ARNP            Ot            K62.89          
  OTHER SPECIFIED DISEASES OF ANUS AND REC                     

 

 2018            RADHA, TABBY ARNP            Ot            K64.9            
UNSPECIFIED HEMORRHOIDS                     

 

 2018            RADHA, TABBY ARNP            Ot            F17.210         
   NICOTINE DEPENDENCE, CIGARETTES, UNCOMPL                     

 

 2018            RADHA, TABBY ARNP            Ot            F32.9            
MAJOR DEPRESSIVE DISORDER, SINGLE EPISOD                     

 

 2018            RADHA, TABBY ARNP            Ot            F41.9            
ANXIETY DISORDER, UNSPECIFIED                     

 

 2018            RADHA, TABBY ARNP            Ot            G47.9            
SLEEP DISORDER, UNSPECIFIED                     

 

 2018            RADHA, TABBY ARNP            Ot            K62.89          
  OTHER SPECIFIED DISEASES OF ANUS AND REC                     

 

 2018            RADHA, TABBY ARNP            Ot            K64.9            
UNSPECIFIED HEMORRHOIDS                     

 

 2018            KAN SOLORIO, ANUEL T            Ot            
F17.210            NICOTINE DEPENDENCE, CIGARETTES, UNCOMPL                     

 

 2018            ANUEL OMER MD T            Ot            F32.9 
           MAJOR DEPRESSIVE DISORDER, SINGLE EPISOD                     

 

 2018            ANUEL OMER MD            Ot            F41.9 
           ANXIETY DISORDER, UNSPECIFIED                     

 

 2018            ANUEL OMER MD T            Ot            F43.10
            POST-TRAUMATIC STRESS DISORDER, UNSPECIF                     

 

 2018            ANUEL OMER MD T            Ot            N39.0 
           URINARY TRACT INFECTION, SITE NOT SPECIF                     

 

 2018            ANUEL OMER MD T            Ot            R10.2 
           PELVIC AND PERINEAL PAIN                     

 

 2018            ANUEL OMER MD T            Ot            
F17.210            NICOTINE DEPENDENCE, CIGARETTES, UNCOMPL                     

 

 2018            ANUEL OMER MD            Ot            F32.9 
           MAJOR DEPRESSIVE DISORDER, SINGLE EPISOD                     

 

 2018            ANUEL OMER MD T            Ot            F41.9 
           ANXIETY DISORDER, UNSPECIFIED                     

 

 2018            ANUEL OMER MD T            Ot            F43.10
            POST-TRAUMATIC STRESS DISORDER, UNSPECIF                     

 

 2018            ANUEL OMER MD T            Ot            N39.0 
           URINARY TRACT INFECTION, SITE NOT SPECIF                     

 

 2018            KAN SOLORIO, ANUEL T            Ot            R10.2 
           PELVIC AND PERINEAL PAIN                     

 

 2018            BERNOT ANSHU            Ot            F17.210         
   NICOTINE DEPENDENCE, CIGARETTES, UNCOMPL                     

 

 2018            BERNOT NASHU            Ot            F32.9            
MAJOR DEPRESSIVE DISORDER, SINGLE EPISOD                     

 

 2018            BERNOT ANSHU            Ot            F41.9            
ANXIETY DISORDER, UNSPECIFIED                     

 

 2018            BERNOT ANSHU            Ot            F43.10          
  POST-TRAUMATIC STRESS DISORDER, UNSPECIF                     

 

 2018            BERNOT ANSHU            Ot            J06.9            
ACUTE UPPER RESPIRATORY INFECTION, UNSPE                     

 

 2018            BERNOT, ANSHU            Ot            R05            
COUGH                     

 

 2018            BERNOT, ANSHU            Ot            Z79.52          
  LONG TERM (CURRENT) USE OF SYSTEMIC STER                     

 

 2018            ANSHU THAKUR            Ot            F17.210         
   NICOTINE DEPENDENCE, CIGARETTES, UNCOMPL                     

 

 2018            VASYL THAKURIS            Ot            F32.9            
MAJOR DEPRESSIVE DISORDER, SINGLE EPISOD                     

 

 2018            OFE ANSHU            Ot            F41.9            
ANXIETY DISORDER, UNSPECIFIED                     

 

 2018            BERNMAU, ANSHU            Ot            F43.10          
  POST-TRAUMATIC STRESS DISORDER, UNSPECIF                     

 

 2018            BERNMAU, ANSHU            Ot            J06.9            
ACUTE UPPER RESPIRATORY INFECTION, UNSPE                     

 

 2018            JEANETHOT, ANSHU            Ot            R05            
COUGH                     

 

 2018            BERNOT, ANSHU            Ot            Z79.52          
  LONG TERM (CURRENT) USE OF SYSTEMIC STER                     

 

 2018            OFE ANSHU            Ot            F17.210         
   NICOTINE DEPENDENCE, CIGARETTES, UNCOMPL                     

 

 2018            OFE ANSHU            Ot            F32.9            
MAJOR DEPRESSIVE DISORDER, SINGLE EPISOD                     

 

 2018            BERNOT ANSHU            Ot            F41.9            
ANXIETY DISORDER, UNSPECIFIED                     

 

 2018            BERNOT, ANSHU            Ot            F43.10          
  POST-TRAUMATIC STRESS DISORDER, UNSPECIF                     

 

 2018            BERNMAU ANSHU            Ot            J06.9            
ACUTE UPPER RESPIRATORY INFECTION, UNSPE                     

 

 2018            BERNOT, ANSHU            Ot            R05            
COUGH                     

 

 2018            BERNOT, ANSHU            Ot            Z79.52          
  LONG TERM (CURRENT) USE OF SYSTEMIC STER                     



                                                                               
                                                     



Procedures

      





 Code            Description            Performed By            Performed On   
     

 

             55800                                  PREGNANCY TEST, URINE (IN-
HOUSE)                                   2014        

 

             34838                                  STREP A  (IN-HOUSE)        
                           2014        

 

             11665                                  PREGNANCY TEST, URINE (IN-
HOUSE)                                   2014        



                      



Results

      





 Test            Result            Range        









 Complete urinalysis with reflex to culture - 06/10/18 16:49         









 Urine color determination            YELLOW             NRG        

 

 Urine clarity determination            VERY CLOUDY             NRG        

 

 Urine pH measurement by test strip            7             5-9        

 

 Specific gravity of urine by test strip            1.010             1.016-
1.022        

 

 Urine protein assay by test strip, semi-quantitative            NEGATIVE      
       NEGATIVE        

 

 Urine glucose detection by automated test strip            NEGATIVE           
  NEGATIVE        

 

 Erythrocytes detection in urine sediment by light microscopy            3+    
         NEGATIVE        

 

 Urine ketones detection by automated test strip            NEGATIVE           
  NEGATIVE        

 

 Urine nitrite detection by test strip            NEGATIVE             NEGATIVE
        

 

 Urine total bilirubin detection by test strip            NEGATIVE             
NEGATIVE        

 

 Urine urobilinogen measurement by automated test strip (mass/volume)          
  NORMAL             NORMAL        

 

 Urine leukocyte esterase detection by dipstick            3+             
NEGATIVE        

 

 Automated urine sediment erythrocyte count by microscopy (number/high power 
field)            RARE             NRG        

 

 Automated urine sediment leukocyte count by microscopy (number/high power field
)             [HPF]            NRG        

 

 Bacteria detection in urine sediment by light microscopy            NEGATIVE  
           NRG        

 

 Squamous epithelial cells detection in urine sediment by light microscopy     
       10-25             NRG        

 

 Crystals detection in urine sediment by light microscopy            PRESENT   
          NRG        

 

 Casts detection in urine sediment by light microscopy            NONE         
    NRG        

 

 Mucus detection in urine sediment by light microscopy            NEGATIVE     
        NRG        

 

 Complete urinalysis with reflex to culture            NO             NRG      
  

 

 Amorphous sediment detection in urine sediment by light microscopy            
LARGE EMILIANA URATES             NRG        

 

 Renal epithelial cells detection in urine sediment by light microscopy        
    NONE             NRG        









 Complete urinalysis with reflex to culture - 18 03:30         









 Urine color determination            BIJU             NRG        

 

 Urine clarity determination            SLIGHTLY CLOUDY             NRG        

 

 Urine pH measurement by test strip            5             5-9        

 

 Specific gravity of urine by test strip            1.030             1.016-
1.022        

 

 Urine protein assay by test strip, semi-quantitative            2+             
NEGATIVE        

 

 Urine glucose detection by automated test strip            NEGATIVE           
  NEGATIVE        

 

 Erythrocytes detection in urine sediment by light microscopy            5+    
         NEGATIVE        

 

 Urine ketones detection by automated test strip            1+             
NEGATIVE        

 

 Urine nitrite detection by test strip            NEGATIVE             NEGATIVE
        

 

 Urine total bilirubin detection by test strip            NEGATIVE             
NEGATIVE        

 

 Urine urobilinogen measurement by automated test strip (mass/volume)          
  1 mg/dL            NORMAL        

 

 Urine leukocyte esterase detection by dipstick            2+             
NEGATIVE        

 

 Automated urine sediment erythrocyte count by microscopy (number/high power 
field)            > [HPF]            NRG        

 

 Automated urine sediment leukocyte count by microscopy (number/high power field
)             [HPF]            NRG        

 

 Bacteria detection in urine sediment by light microscopy            MODERATE  
           NRG        

 

 Squamous epithelial cells detection in urine sediment by light microscopy     
       2-5             NRG        

 

 Crystals detection in urine sediment by light microscopy            NONE      
       NRG        

 

 Casts detection in urine sediment by light microscopy            NONE         
    NRG        

 

 Mucus detection in urine sediment by light microscopy            NEGATIVE     
        NRG        

 

 Complete urinalysis with reflex to culture            YES             NRG     
   









 Bacterial urine culture - 18 03:30         









 Bacterial urine culture            SEE COMMEN             NRG        

 

 COLONY COUNT            .             NRG        



                    



Encounters

      





 ACCT No.            Visit Date/Time            Discharge            Status    
        Pt. Type            Provider            Facility            Loc./Unit  
          Complaint        

 

 [id number]            2014 14:24:00            2014 23:59:59          
  CLS            Outpatient            ANJU SPRAGUE                  
                             

 

 [id number]            2014 13:39:00            2014 23:59:59          
  CLS            Outpatient            JOSE ELIAS LAWTON           
                                    

 

 [id number]            2018 10:06:00            2018 12:12:00    
        DIS            Emergency            ANSHU THAKUR            [facility] 
[facility]            ER            COUGH/SOA        

 

 [id number]            2018 03:18:00            2018 04:10:00    
        DIS            Emergency            KAN SOLORIO, ANUEL T            
[facility]            ER            PELVIC PAIN        

 

 [id number]            06/10/2018 16:12:00            06/10/2018 17:40:00    
        DIS            Emergency            TABBY GARCIA            [address] 
[facility]            ER            RECTAL BLEEDING        

 

 [id number]            2013 15:20:00            2013 23:59:59    
        CLS            Outpatient                                              
              

 

 [id number]            2018 08:20:00            2018 23:59:59            
CLS            Outpatient            NIDIA WILD LAC                         
[facility]                     

 

 [id number]            2018 10:10:34                         ACT            
Unknown

## 2018-11-14 VITALS — SYSTOLIC BLOOD PRESSURE: 118 MMHG | DIASTOLIC BLOOD PRESSURE: 73 MMHG

## 2018-11-14 NOTE — ED GU-FEMALE
General


Chief Complaint:  Cough/Cold/Flu Symptoms


Stated Complaint:  PAIN IN UTERUS SHOOTING UP LOWER BACK


Nursing Triage Note:  


PT ARRIVES TO ED ROOM #9 WITH C/O UTERINE PAIN THAT RADIATES TO HER LOWER BACK.

  


PT STATES THAT SHE HAS BEEN HAVING INCREASED MENSTRUAL PAIN THAT STARTED SINCE 


THE STARTED THE DEPO SHOT THREE YEARS AGO.  PT STATES THAT SHE EXPERIENCED A 


MISCARRIAGE LAST WEEK 11/4 IN WHICH SHE HAD BLEEDING FOR TWO DAYS.  PT STATES 


THAT SHE STARTED HER PERIOD TODAY AND THE PAIN IS 8/10 WITH NO RELIEF FROM 


HEATING PADS, IBUPROFEN, PAMPRIN, WARM BATHS, OR TYLENOL. PT STATES LAST MONTH 


SHE HAD TO TAKE PAIN PILLS, BUT EVEN THE PAIN PILLS DO NOT HELP.


Nursing Sepsis Screen:  No Definite Risk


Source:  patient


Exam Limitations:  no limitations





History of Present Illness


Date Seen by Provider:  Nov 13, 2018


Time Seen by Provider:  23:01


Initial Comments


This 22-year-old woman presents to the emergency room with complaints of pelvic 

cramping and pain with the start of her menses today.  She reports having a 

miscarriage on November 4.  She has seen Dr. Ragsdale since then and states 

he performed a pelvic exam and an ultrasound in the clinic.  She reports no 

abnormalities were identified.  She has been trying Pamprin and ibuprofen as 

well as a heating pad without sufficient improvement.  She reports having 

escalation in menstrual pain over the past few months.  She denies having any 

pain at all between her miscarriage and this morning.





Allergies and Home Medications


Allergies


Coded Allergies:  


     No Known Drug Allergies (Unverified , 8/16/09)





Home Medications


Azithromycin 250 Mg Tablet, 250 MG PO UD


   TAKE 2 TABLETS TODAY, THEN TAKE 1 TABLET DAILY FOR 4 MORE DAYS 


   Prescribed by: ANSHU THAKUR on 9/18/18 1120


Hyoscyamine Sulfate 0.125 Mg Tab.subl, 0.125 MG SL Q4H PRN for CRAMPS


   Prescribed by: ANUEL BARTON on 11/14/18 0004


Prednisone 10 Mg Tab.ds.pk, 10 MG PO UD


   Prescribed by: ANSHU THAKUR on 9/18/18 1120





Patient Home Medication List


Home Medication List Reviewed:  Yes





Review of Systems


Review of Systems


Constitutional:  no symptoms reported


EENTM:  no symptoms reported


Respiratory:  no symptoms reported


Cardiovascular:  no symptoms reported


Gastrointestinal:  no symptoms reported


Genitourinary:  see HPI


Pregnant:  No


Musculoskeletal:  no symptoms reported


Skin:  no symptoms reported


Psychiatric/Neurological:  No Symptoms Reported


Endocrine:  No Symptoms Reported


Hematologic/Lymphatic:  No Symptoms Reported





Past Medical-Social-Family Hx


Past Med/Social Hx:  Reviewed Nursing Past Med/Soc Hx


Patient Social History


Alcohol Use:  Denies Use


Recreational Drug Use:  No


Smoking Status:  Current Everyday Smoker


Type Used:  Cigarettes


2nd Hand Smoke Exposure:  No


Recent Foreign Travel:  No


Contact w/Someone Who Travel:  No


Recent Infectious Disease Expo:  No


Recent Hopitalizations:  No


Physical Abuse:  No


Sexual Abuse:  No


Mistreated:  No


Fear:  No





Immunizations Up To Date


Tetanus Booster (TDap):  Unknown





Seasonal Allergies


Seasonal Allergies:  No





Past Medical History


Surgeries:  No


Respiratory:  No


Cardiac:  No


Neurological:  No


Female Reproductive Disorders:  Menstrual Problems


Genitourinary:  No


Gastrointestinal:  No


Musculoskeletal:  No


Endocrine:  No


HEENT:  No


Cancer:  No


Psychosocial:  Yes


Sleep Difficulties, Anxiety, PTSD, Depression


Integumentary:  No


Blood Disorders:  No


Adverse Reaction/Blood Tranf:  No





Physical Exam


Vital Signs





Vital Signs - First Documented








 11/13/18





 22:50


 


Temp 97.1


 


Pulse 90


 


Resp 18


 


B/P (MAP) 138/87 (104)


 


Pulse Ox 95


 


O2 Delivery Room Air





Capillary Refill : Less Than 3 Seconds


Height, Weight, BMI


Height: 5'8.00"


Weight: 240lbs. oz. [id number];  BMI


Method:Stated


General Appearance:  WD/WN, mild distress


HEENT:  normal ENT inspection


Neck:  normal inspection


Cardiovascular:  regular rate, rhythm, no edema, no murmur


Respiratory:  lungs clear, normal breath sounds, no respiratory distress


Gastrointestinal:  normal bowel sounds, soft, tenderness (suprapubic region)


Extremities:  normal inspection, no pedal edema


Neurologic/Psychiatric:  CNs II-XII nml as tested, no motor/sensory deficits, 

alert, normal mood/affect, oriented x 3


Skin:  normal color, warm/dry





Progress/Results/Core Measures


Suspected Sepsis


Recent Fever Within 48 Hours:  No


Infection Criteria Present:  None


New/Unexplained  Altered Menta:  No


Sepsis Screen:  No Definite Risk


SIRS


Temperature:97.1 


Pulse: 90 


Respiratory Rate: 18


 


Blood Pressure 138 /87 


Mean: 104





Results/Orders


Lab Results





Laboratory Tests








Test


 11/13/18


23:20 Range/Units


 


 


Urine Color YELLOW   


 


Urine Clarity


 SLIGHTLY


CLOUDY  





 


Urine pH 7  5-9  


 


Urine Specific Gravity 1.010 L 1.016-1.022  


 


Urine Protein NEGATIVE  NEGATIVE  


 


Urine Glucose (UA) NEGATIVE  NEGATIVE  


 


Urine Ketones NEGATIVE  NEGATIVE  


 


Urine Nitrite NEGATIVE  NEGATIVE  


 


Urine Bilirubin NEGATIVE  NEGATIVE  


 


Urine Urobilinogen NORMAL  NORMAL  MG/DL


 


Urine Leukocyte Esterase 1+ H NEGATIVE  


 


Urine RBC (Auto) 5+ H NEGATIVE  


 


Urine RBC >100 H  /HPF


 


Urine WBC 0-2   /HPF


 


Urine Squamous Epithelial


Cells 2-5 


  /HPF





 


Urine Crystals NONE   /LPF


 


Urine Bacteria TRACE   /HPF


 


Urine Casts NONE   /LPF


 


Urine Mucus SMALL H  /LPF


 


Urine Culture Indicated NO   








My Orders





Orders - ANUEL OMER MD


Ua Culture If Indicated (11/13/18 23:01)


Urine Pregnancy Bedside (11/13/18 23:17)


Hyoscyamine Sl Tablet (Levsin Sl Tablet) (11/13/18 23:30)





Medications Given in ED





Current Medications








 Medications  Dose


 Ordered  Sig/Nicola


 Route  Start Time


 Stop Time Status Last Admin


Dose Admin


 


 Hyoscyamine


 Sulfate  0.25 mg  ONCE  ONCE


 SL  11/13/18 23:30


 11/13/18 23:31 DC 11/13/18 23:35


0.25 MG








Vital Signs/I&O











 11/13/18 11/14/18





 22:50 00:11


 


Temp 97.1 97.1


 


Pulse 90 71


 


Resp 18 18


 


B/P (MAP) 138/87 (104) 118/73 (88)


 


Pulse Ox 95 98


 


O2 Delivery Room Air Room Air





Capillary Refill : Less Than 3 Seconds








Blood Pressure Mean:  104











Point of Care Testing


Urine Pregnancy-Bedside:  Negative


Progress Note :  


Progress Note


UA was unremarkable and bedside pregnancy test was negative.  Ultrasound and 

pelvic exam were not felt necessary as patient states these were recently done 

by Dr. Ragsdale.  Levsin was given for the cramping which greatly improved 

her discomfort.  She was dismissed home to outpatient follow-up again with Dr. Ragsdale.





Departure


Impression





 Primary Impression:  


 Dysmenorrhea


 Additional Impression:  


 Pelvic cramping


Disposition:  01 HOME, SELF-CARE


Condition:  Improved





Departure-Patient Inst.


Referrals:  


GEOFFREY LUCIA DO (PCP/Family)


Primary Care Physician


Patient Instructions:  Menstrual Cramps





Add. Discharge Instructions:  


For pain may take ibuprofen up to 600 mg every 6 hours as needed.  Add Tylenol (

acetaminophen) up to 1000 mg every 6 hours as needed for additional pain relief.


For cramping you may use Levsin as prescribed.


Follow-up with Dr. Ragsdale to review Pap smear results and to discuss 

further treatment of your painful periods.











All discharge instructions reviewed with patient and/or family. Voiced 

understanding.


Scripts


Hyoscyamine Sulfate (Levsin-Sl) 0.125 Mg Tab.subl


0.125 MG SL Q4H PRN for CRAMPS, #10 TAB


   Prov: ANUEL OMER MD         11/14/18





Copy


Copies To 1:   JAVIER RAGSDALE MD, JOSHUA T MD Nov 14, 2018 00:06

## 2018-12-31 ENCOUNTER — HOSPITAL ENCOUNTER (OUTPATIENT)
Dept: HOSPITAL 75 - ER | Age: 23
Discharge: HOME | End: 2018-12-31
Attending: SURGERY
Payer: MEDICAID

## 2018-12-31 VITALS — DIASTOLIC BLOOD PRESSURE: 66 MMHG | SYSTOLIC BLOOD PRESSURE: 114 MMHG

## 2018-12-31 VITALS — DIASTOLIC BLOOD PRESSURE: 75 MMHG | SYSTOLIC BLOOD PRESSURE: 119 MMHG

## 2018-12-31 VITALS — DIASTOLIC BLOOD PRESSURE: 68 MMHG | SYSTOLIC BLOOD PRESSURE: 110 MMHG

## 2018-12-31 VITALS — HEIGHT: 68 IN | BODY MASS INDEX: 36.22 KG/M2 | WEIGHT: 239 LBS

## 2018-12-31 VITALS — DIASTOLIC BLOOD PRESSURE: 70 MMHG | SYSTOLIC BLOOD PRESSURE: 118 MMHG

## 2018-12-31 VITALS — DIASTOLIC BLOOD PRESSURE: 75 MMHG | SYSTOLIC BLOOD PRESSURE: 121 MMHG

## 2018-12-31 DIAGNOSIS — Z11.2: ICD-10-CM

## 2018-12-31 DIAGNOSIS — F17.210: ICD-10-CM

## 2018-12-31 DIAGNOSIS — F43.10: ICD-10-CM

## 2018-12-31 DIAGNOSIS — F32.9: ICD-10-CM

## 2018-12-31 DIAGNOSIS — Z79.52: ICD-10-CM

## 2018-12-31 DIAGNOSIS — K80.12: Primary | ICD-10-CM

## 2018-12-31 LAB
ALBUMIN SERPL-MCNC: 4.5 GM/DL (ref 3.2–4.5)
ALP SERPL-CCNC: 94 U/L (ref 40–136)
ALT SERPL-CCNC: 14 U/L (ref 0–55)
AMORPH SED URNS QL MICRO: (no result) /LPF
APTT PPP: YELLOW S
BACTERIA #/AREA URNS HPF: (no result) /HPF
BASOPHILS # BLD AUTO: 0 10^3/UL (ref 0–0.1)
BASOPHILS NFR BLD AUTO: 0 % (ref 0–10)
BILIRUB SERPL-MCNC: 0.3 MG/DL (ref 0.1–1)
BILIRUB UR QL STRIP: NEGATIVE
BUN/CREAT SERPL: 15
CALCIUM SERPL-MCNC: 9.8 MG/DL (ref 8.5–10.1)
CHLORIDE SERPL-SCNC: 108 MMOL/L (ref 98–107)
CO2 SERPL-SCNC: 22 MMOL/L (ref 21–32)
CREAT SERPL-MCNC: 0.82 MG/DL (ref 0.6–1.3)
EOSINOPHIL # BLD AUTO: 0.2 10^3/UL (ref 0–0.3)
EOSINOPHIL NFR BLD AUTO: 2 % (ref 0–10)
ERYTHROCYTE [DISTWIDTH] IN BLOOD BY AUTOMATED COUNT: 12.5 % (ref 10–14.5)
FIBRINOGEN PPP-MCNC: (no result) MG/DL
GFR SERPLBLD BASED ON 1.73 SQ M-ARVRAT: > 60 ML/MIN
GLUCOSE SERPL-MCNC: 116 MG/DL (ref 70–105)
GLUCOSE UR STRIP-MCNC: NEGATIVE MG/DL
HCT VFR BLD CALC: 40 % (ref 35–52)
HGB BLD-MCNC: 13.7 G/DL (ref 11.5–16)
KETONES UR QL STRIP: NEGATIVE
LEUKOCYTE ESTERASE UR QL STRIP: (no result)
LIPASE SERPL-CCNC: 26 U/L (ref 8–78)
LYMPHOCYTES # BLD AUTO: 3.1 X 10^3 (ref 1–4)
LYMPHOCYTES NFR BLD AUTO: 29 % (ref 12–44)
MANUAL DIFFERENTIAL PERFORMED BLD QL: NO
MCH RBC QN AUTO: 29 PG (ref 25–34)
MCHC RBC AUTO-ENTMCNC: 34 G/DL (ref 32–36)
MCV RBC AUTO: 84 FL (ref 80–99)
MONOCYTES # BLD AUTO: 0.9 X 10^3 (ref 0–1)
MONOCYTES NFR BLD AUTO: 9 % (ref 0–12)
NEUTROPHILS # BLD AUTO: 6.4 X 10^3 (ref 1.8–7.8)
NEUTROPHILS NFR BLD AUTO: 60 % (ref 42–75)
NITRITE UR QL STRIP: NEGATIVE
PH UR STRIP: 7 [PH] (ref 5–9)
PLATELET # BLD: 324 10^3/UL (ref 130–400)
PMV BLD AUTO: 10.4 FL (ref 7.4–10.4)
POTASSIUM SERPL-SCNC: 4 MMOL/L (ref 3.6–5)
PROT SERPL-MCNC: 7.2 GM/DL (ref 6.4–8.2)
PROT UR QL STRIP: NEGATIVE
RBC # BLD AUTO: 4.81 10^6/UL (ref 4.35–5.85)
RBC #/AREA URNS HPF: (no result) /HPF
SODIUM SERPL-SCNC: 142 MMOL/L (ref 135–145)
SP GR UR STRIP: 1.01 (ref 1.02–1.02)
SQUAMOUS #/AREA URNS HPF: (no result) /HPF
UROBILINOGEN UR-MCNC: NORMAL MG/DL
WBC # BLD AUTO: 10.7 10^3/UL (ref 4.3–11)
WBC #/AREA URNS HPF: (no result) /HPF

## 2018-12-31 PROCEDURE — 80053 COMPREHEN METABOLIC PANEL: CPT

## 2018-12-31 PROCEDURE — 81000 URINALYSIS NONAUTO W/SCOPE: CPT

## 2018-12-31 PROCEDURE — 83690 ASSAY OF LIPASE: CPT

## 2018-12-31 PROCEDURE — 87081 CULTURE SCREEN ONLY: CPT

## 2018-12-31 PROCEDURE — 76705 ECHO EXAM OF ABDOMEN: CPT

## 2018-12-31 PROCEDURE — 94664 DEMO&/EVAL PT USE INHALER: CPT

## 2018-12-31 PROCEDURE — 36415 COLL VENOUS BLD VENIPUNCTURE: CPT

## 2018-12-31 PROCEDURE — 85025 COMPLETE CBC W/AUTO DIFF WBC: CPT

## 2018-12-31 PROCEDURE — 84703 CHORIONIC GONADOTROPIN ASSAY: CPT

## 2018-12-31 RX ADMIN — SODIUM CHLORIDE, SODIUM LACTATE, POTASSIUM CHLORIDE, AND CALCIUM CHLORIDE PRN MLS/HR: 600; 310; 30; 20 INJECTION, SOLUTION INTRAVENOUS at 13:45

## 2018-12-31 RX ADMIN — SODIUM CHLORIDE, SODIUM LACTATE, POTASSIUM CHLORIDE, AND CALCIUM CHLORIDE PRN MLS/HR: 600; 310; 30; 20 INJECTION, SOLUTION INTRAVENOUS at 11:30

## 2018-12-31 NOTE — NUR
TO AMB SURG FROM PAR PER CART.  AWAKE ON RETURN TO ROOM AND C/O RUQ ABD PAIN, BUT QUICKLY 
BACK TO SLEEP.  ON O2 AT 3L PER NC.  DERMABOND INTACT TO X2 LAP ABD SITES (UMBILICAL AND 
RIGHT, MID ABD) AND LARGE BANDAID D/I OVER LEFT UPPER ABD SITE.  ICE PACK REMOVED BY AND 
REFUSED BY PT. STATES "IT'S TO COLD."  PO FLUIDS TO BEDSIDE.

## 2018-12-31 NOTE — NUR
TO AMB SURG FROM ED PER WC WITH FAMILY AND ED STAFF X2.  PHONE REPORT RECEIVED FROM FREDDIE ALLEN RN.

## 2018-12-31 NOTE — DIAGNOSTIC IMAGING REPORT
PROCEDURE: US Gallbladder.



TECHNIQUE: Multiple real-time grayscale images were obtained over

the right upper quadrant in various projections.



INDICATION:  Right upper quadrant abdominal pain. 



COMPARISON: None.



FINDINGS: Normal echogenicity of the liver with no focal mass or

fluid collection. No intrahepatic biliary ductal dilatation. The

common bile duct measures 0.3 cm in diameter. There is a

shadowing stone in the neck of the gallbladder measuring up to

2.1 cm. Gallbladder wall is mildly thickened, measuring up to 0.5

cm. No pericholecystic fluid. Negative sonographic Hammonds sign.

Patent IVC. The visualized portions of the pancreas are

unremarkable. The right kidney measures 10.1 cm. No right renal

mass, cyst, shadowing stone or hydronephrosis.



IMPRESSION: Shadowing gallstone in the neck of the gallbladder

measuring up to 2.1 cm. The gallbladder wall is mildly thickened

up to 0.5 cm. No pericholecystic fluid. Negative sonographic

Hammonds sign. HIDA scan may be helpful for further evaluation.



Dictated by: 



  Dictated on workstation # [id number]

## 2018-12-31 NOTE — DISCHARGE INST-SURGICAL
D/C Lap Instructions-EUGENIO


New, Converted, or Re-Newed RX:  RX on Chart


Follow Up Appt in 2 weeks





Activity as tolerated


No driving for 24 hours


No driving while on pain medications





Incentive Spirometry use every 2 hours while awake





Regular Diet





Symptoms to Report: Fever over 101 degree F, Nausea/Vomiting 


Infection Signs and Symptoms to report:  Increased redness, Foul odor of wound, 

Increased drainage





Bathing instructions: May shower


Operative Area Clean/Dry;  Keep incision clean/dry





If any problems/questions: Contact your physician or go to Emergency Room











MASSIEL BECKER MD Dec 31, 2018 10:59

## 2018-12-31 NOTE — NUR
SAO2 96% ON ROOM AIR.  DISCHARGE INSTRUCTIONS PROVIDED.  MOVES SELF EASILY TO SIDE OF BED TO 
DRESS FOR HOME.  NO CHANGE IN SITE ASSESSMENT.

## 2018-12-31 NOTE — NUR
HAS BEEN RESTING QUIETLY, AWAKE NOW AND ON CELL PHONE.  CONTINUES TO REFUSE ICE PACK, 
STATING "IT DON'T HURT THAT BAD."  O2 DC'D.

## 2018-12-31 NOTE — PROGRESS NOTE-POST OPERATIVE
Post-Operative Progess Note


Surgeon (s)/Assistant (s)


Surgeon


MASSIEL BECKER MD


Assistant:  sandra jaime APRSTANISLAV





Pre-Operative Diagnosis


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





Post-Operative Diagnosis





same





Procedure & Operative Findings


Date of Procedure


12/31/18


Procedure Performed/Findings


laparoscopic cholecystectomy


Anesthesia Type


GET





Estimated Blood Loss


Estimated blood loss (mL):  minimal





Specimens/Packing


Specimens Removed


gallbladder











MASSIEL BECKER MD Dec 31, 2018 14:12

## 2018-12-31 NOTE — NUR
HAS BEEN RESTING QUIETLY WITH EYES CLOSED, EVEN, UNLABORED RESPIRATIONS.  NO CHANGE IN SITE 
ASSESSMENT.  AWAKENS EASILY.  MOVES SELF EASILY IN BED.  TAKING PO FLUIDS AND CRACKERS.

## 2018-12-31 NOTE — XMS REPORT
Continuity of Care Document

 Created on: 2018



MARIE BARNETT

External Reference #: [id number]

: 1995

Sex: Female



Demographics







 Address  [address]  [address]

 

 Home Phone  [phone] x

 

 Preferred Language  Unknown

 

 Marital Status  Unknown

 

 Synagogue Affiliation  Unknown

 

 Race  Unknown

 

 Ethnic Group  Unknown





Author







 Author  [facility]

 

 Address  Unknown

 

 Phone  Unavailable



              



Allergies

      





 Active            Description            Code            Type            
Severity            Reaction            Onset            Reported/Identified   
         Relationship to Patient            Clinical Status        

 

 Yes            No Known Drug Allergies            [id number]            Drug 
Allergy            Mild            N/A                         2009      
                            



                  



Medications

      



There is no data.                  



Problems

      





 Date Dx Coded            Attending            Type            Code            
Diagnosis            Diagnosed By        

 

 2014            JOSE ELIAS LAWTON                         
477.9            ALLERGIC RHINITIS CAUSE UNSPECIFIED                     

 

 2014            JOSE ELIAS LAWTON N                         
478.19            OTHER DISEASES OF NASAL CAVITY AND SINUSES                   
  

 

 2014            ANJU SPRAGUE A                         477.9    
        ALLERGIC RHINITIS CAUSE UNSPECIFIED                     

 

 2014            ANJU SPRAGUE A                         478.19   
         OTHER DISEASES OF NASAL CAVITY AND SINUSES                     

 

 2014            ANJU SPRAGUE A                         V25.01   
         CONTRACEPTION - ORAL CONTRACEPTION                     

 

 2014            ANJU SPRAGUE A                         V65.45   
         STD COUNSELING                     

 

 2014            ANJU SPRAGUE A                         V72.41   
         PREGNANCY TEST NEGATIVE RESULT                     

 

 06/10/2018            TABBY GARCIAP            Ot            F17.210         
   NICOTINE DEPENDENCE, CIGARETTES, UNCOMPL                     

 

 06/10/2018            TABBY GARCIAP            Ot            F32.9            
MAJOR DEPRESSIVE DISORDER, SINGLE EPISOD                     

 

 06/10/2018            TABBY GARCIA ARNP            Ot            F41.9            
ANXIETY DISORDER, UNSPECIFIED                     

 

 06/10/2018            TABBY GARCIAP            Ot            G47.9            
SLEEP DISORDER, UNSPECIFIED                     

 

 06/10/2018            TABBY GARCIA ARNP            Ot            K62.89          
  OTHER SPECIFIED DISEASES OF ANUS AND REC                     

 

 06/10/2018            TABBY GARCIAP            Ot            K64.9            
UNSPECIFIED HEMORRHOIDS                     

 

 2018            TABBY GARCIA ARNP            Ot            F17.210         
   NICOTINE DEPENDENCE, CIGARETTES, UNCOMPL                     

 

 2018            RADHA TABBY ARNP            Ot            F32.9            
MAJOR DEPRESSIVE DISORDER, SINGLE EPISOD                     

 

 2018            RADHA TABBY ARNP            Ot            F41.9            
ANXIETY DISORDER, UNSPECIFIED                     

 

 2018            RADHA, TABBY ARNP            Ot            G47.9            
SLEEP DISORDER, UNSPECIFIED                     

 

 2018            RADHA, TABBY ARNP            Ot            K62.89          
  OTHER SPECIFIED DISEASES OF ANUS AND REC                     

 

 2018            RADHA, TABBY ARNP            Ot            K64.9            
UNSPECIFIED HEMORRHOIDS                     

 

 2018            RADHA, TABBY ARNP            Ot            F17.210         
   NICOTINE DEPENDENCE, CIGARETTES, UNCOMPL                     

 

 2018            RADHA, TABBY ARNP            Ot            F32.9            
MAJOR DEPRESSIVE DISORDER, SINGLE EPISOD                     

 

 2018            RADHA, TABBY ARNP            Ot            F41.9            
ANXIETY DISORDER, UNSPECIFIED                     

 

 2018            RADHA, TABBY ARNP            Ot            G47.9            
SLEEP DISORDER, UNSPECIFIED                     

 

 2018            RADHA, TABBY ARNP            Ot            K62.89          
  OTHER SPECIFIED DISEASES OF ANUS AND REC                     

 

 2018            RADHA, TABBY ARNP            Ot            K64.9            
UNSPECIFIED HEMORRHOIDS                     

 

 2018            KAN SOLORIO, ANUEL T            Ot            
F17.210            NICOTINE DEPENDENCE, CIGARETTES, UNCOMPL                     

 

 2018            ANUEL OMER MD T            Ot            F32.9 
           MAJOR DEPRESSIVE DISORDER, SINGLE EPISOD                     

 

 2018            ANUEL OMER MD            Ot            F41.9 
           ANXIETY DISORDER, UNSPECIFIED                     

 

 2018            ANUEL OMER MD T            Ot            F43.10
            POST-TRAUMATIC STRESS DISORDER, UNSPECIF                     

 

 2018            ANUEL OMER MD T            Ot            N39.0 
           URINARY TRACT INFECTION, SITE NOT SPECIF                     

 

 2018            ANUEL OMER MD T            Ot            R10.2 
           PELVIC AND PERINEAL PAIN                     

 

 2018            ANUEL OMER MD T            Ot            
F17.210            NICOTINE DEPENDENCE, CIGARETTES, UNCOMPL                     

 

 2018            ANUEL OMER MD            Ot            F32.9 
           MAJOR DEPRESSIVE DISORDER, SINGLE EPISOD                     

 

 2018            ANUEL OMER MD T            Ot            F41.9 
           ANXIETY DISORDER, UNSPECIFIED                     

 

 2018            ANUEL OMER MD T            Ot            F43.10
            POST-TRAUMATIC STRESS DISORDER, UNSPECIF                     

 

 2018            ANUEL OMER MD T            Ot            N39.0 
           URINARY TRACT INFECTION, SITE NOT SPECIF                     

 

 2018            KAN SOLORIO, ANUEL HO            Ot            R10.2 
           PELVIC AND PERINEAL PAIN                     

 

 2018            BERNOT, ANSHU            Ot            F17.210         
   NICOTINE DEPENDENCE, CIGARETTES, UNCOMPL                     

 

 2018            BERNOT ANSHU            Ot            F32.9            
MAJOR DEPRESSIVE DISORDER, SINGLE EPISOD                     

 

 2018            BERNOT, ANSHU            Ot            F41.9            
ANXIETY DISORDER, UNSPECIFIED                     

 

 2018            BERNOT, ANSHU            Ot            F43.10          
  POST-TRAUMATIC STRESS DISORDER, UNSPECIF                     

 

 2018            BERNOT, ANSHU            Ot            J06.9            
ACUTE UPPER RESPIRATORY INFECTION, UNSPE                     

 

 2018            BERNOT, ANSHU            Ot            R05            
COUGH                     

 

 2018            BERNOT, ANSHU            Ot            Z79.52          
  LONG TERM (CURRENT) USE OF SYSTEMIC STER                     

 

 2018            JEANETHOT, ANSHU            Ot            F17.210         
   NICOTINE DEPENDENCE, CIGARETTES, UNCOMPL                     

 

 2018            OFE ANSHU            Ot            F32.9            
MAJOR DEPRESSIVE DISORDER, SINGLE EPISOD                     

 

 2018            BERNOT ANSHU            Ot            F41.9            
ANXIETY DISORDER, UNSPECIFIED                     

 

 2018            BERNOT, ANSHU            Ot            F43.10          
  POST-TRAUMATIC STRESS DISORDER, UNSPECIF                     

 

 2018            BERNOT, ANSHU            Ot            J06.9            
ACUTE UPPER RESPIRATORY INFECTION, UNSPE                     

 

 2018            BERNOT, ANSHU            Ot            R05            
COUGH                     

 

 2018            BERNOT, ANSHU            Ot            Z79.52          
  LONG TERM (CURRENT) USE OF SYSTEMIC STER                     

 

 2018            BERNOT, ANSHU            Ot            F17.210         
   NICOTINE DEPENDENCE, CIGARETTES, UNCOMPL                     

 

 2018            OFE ANSHU            Ot            F32.9            
MAJOR DEPRESSIVE DISORDER, SINGLE EPISOD                     

 

 2018            BERNOT, ANSHU            Ot            F41.9            
ANXIETY DISORDER, UNSPECIFIED                     

 

 2018            BERNOT, ANSHU            Ot            F43.10          
  POST-TRAUMATIC STRESS DISORDER, UNSPECIF                     

 

 2018            BERNOT ANSHU            Ot            J06.9            
ACUTE UPPER RESPIRATORY INFECTION, UNSPE                     

 

 2018            BERNOT, ANSHU            Ot            R05            
COUGH                     

 

 2018            BERNOT, ANSHU            Ot            Z79.52          
  LONG TERM (CURRENT) USE OF SYSTEMIC STER                     

 

 11/15/2018            KAN SOLORIO, ANUEL HO            Ot            
F17.210            NICOTINE DEPENDENCE, CIGARETTES, UNCOMPL                     

 

 11/15/2018            ANUEL OMER MD, Ot            F32.9 
           MAJOR DEPRESSIVE DISORDER, SINGLE EPISOD                     

 

 11/15/2018            ANUEL OMER MD, Ot            F41.9 
           ANXIETY DISORDER, UNSPECIFIED                     

 

 11/15/2018            ANUEL OMER MD, Ot            F43.10
            POST-TRAUMATIC STRESS DISORDER, UNSPECIF                     

 

 11/15/2018            ANUEL OMER MD, Ot            N94.6 
           DYSMENORRHEA, UNSPECIFIED                     

 

 11/15/2018            ANUEL OMER MD, Ot            R10.2 
           PELVIC AND PERINEAL PAIN                     

 

 11/15/2018            ANUEL OMER MD, Ot            Z79.52
            LONG TERM (CURRENT) USE OF SYSTEMIC STER                     



                                                                               
                                                                   



Procedures

      





 Code            Description            Performed By            Performed On   
     

 

             80477                                  PREGNANCY TEST, URINE (IN-
HOUSE)                                   2014        

 

             34122                                  STREP A  (IN-HOUSE)        
                           2014        

 

             85085                                  PREGNANCY TEST, URINE (IN-
HOUSE)                                   2014        



                      



Results

      





 Test            Result            Range        









 Complete urinalysis with reflex to culture - 06/10/18 16:49         









 Urine color determination            YELLOW             NRG        

 

 Urine clarity determination            VERY CLOUDY             NRG        

 

 Urine pH measurement by test strip            7             5-9        

 

 Specific gravity of urine by test strip            1.010             1.016-
1.022        

 

 Urine protein assay by test strip, semi-quantitative            NEGATIVE      
       NEGATIVE        

 

 Urine glucose detection by automated test strip            NEGATIVE           
  NEGATIVE        

 

 Erythrocytes detection in urine sediment by light microscopy            3+    
         NEGATIVE        

 

 Urine ketones detection by automated test strip            NEGATIVE           
  NEGATIVE        

 

 Urine nitrite detection by test strip            NEGATIVE             NEGATIVE
        

 

 Urine total bilirubin detection by test strip            NEGATIVE             
NEGATIVE        

 

 Urine urobilinogen measurement by automated test strip (mass/volume)          
  NORMAL             NORMAL        

 

 Urine leukocyte esterase detection by dipstick            3+             
NEGATIVE        

 

 Automated urine sediment erythrocyte count by microscopy (number/high power 
field)            RARE             NRG        

 

 Automated urine sediment leukocyte count by microscopy (number/high power field
)             [HPF]            NRG        

 

 Bacteria detection in urine sediment by light microscopy            NEGATIVE  
           NRG        

 

 Squamous epithelial cells detection in urine sediment by light microscopy     
       10-25             NRG        

 

 Crystals detection in urine sediment by light microscopy            PRESENT   
          NRG        

 

 Casts detection in urine sediment by light microscopy            NONE         
    NRG        

 

 Mucus detection in urine sediment by light microscopy            NEGATIVE     
        NRG        

 

 Complete urinalysis with reflex to culture            NO             NRG      
  

 

 Amorphous sediment detection in urine sediment by light microscopy            
LARGE EMILIANA URATES             NRG        

 

 Renal epithelial cells detection in urine sediment by light microscopy        
    NONE             NRG        









 Complete urinalysis with reflex to culture - 18 03:30         









 Urine color determination            BIJU             NRG        

 

 Urine clarity determination            SLIGHTLY CLOUDY             NRG        

 

 Urine pH measurement by test strip            5             5-9        

 

 Specific gravity of urine by test strip            1.030             1.016-
1.022        

 

 Urine protein assay by test strip, semi-quantitative            2+             
NEGATIVE        

 

 Urine glucose detection by automated test strip            NEGATIVE           
  NEGATIVE        

 

 Erythrocytes detection in urine sediment by light microscopy            5+    
         NEGATIVE        

 

 Urine ketones detection by automated test strip            1+             
NEGATIVE        

 

 Urine nitrite detection by test strip            NEGATIVE             NEGATIVE
        

 

 Urine total bilirubin detection by test strip            NEGATIVE             
NEGATIVE        

 

 Urine urobilinogen measurement by automated test strip (mass/volume)          
  1 mg/dL            NORMAL        

 

 Urine leukocyte esterase detection by dipstick            2+             
NEGATIVE        

 

 Automated urine sediment erythrocyte count by microscopy (number/high power 
field)            > [HPF]            NRG        

 

 Automated urine sediment leukocyte count by microscopy (number/high power field
)             [HPF]            NRG        

 

 Bacteria detection in urine sediment by light microscopy            MODERATE  
           NRG        

 

 Squamous epithelial cells detection in urine sediment by light microscopy     
       2-5             NRG        

 

 Crystals detection in urine sediment by light microscopy            NONE      
       NRG        

 

 Casts detection in urine sediment by light microscopy            NONE         
    NRG        

 

 Mucus detection in urine sediment by light microscopy            NEGATIVE     
        NRG        

 

 Complete urinalysis with reflex to culture            YES             NRG     
   









 Bacterial urine culture - 18 03:30         









 Bacterial urine culture            SEE COMMEN             NRG        

 

 COLONY COUNT            .             NRG        









 Complete urinalysis with reflex to culture - 18 23:20         









 Urine color determination            YELLOW             NRG        

 

 Urine clarity determination            SLIGHTLY CLOUDY             NRG        

 

 Urine pH measurement by test strip            7             5-9        

 

 Specific gravity of urine by test strip            1.010             1.016-
1.022        

 

 Urine protein assay by test strip, semi-quantitative            NEGATIVE      
       NEGATIVE        

 

 Urine glucose detection by automated test strip            NEGATIVE           
  NEGATIVE        

 

 Erythrocytes detection in urine sediment by light microscopy            5+    
         NEGATIVE        

 

 Urine ketones detection by automated test strip            NEGATIVE           
  NEGATIVE        

 

 Urine nitrite detection by test strip            NEGATIVE             NEGATIVE
        

 

 Urine total bilirubin detection by test strip            NEGATIVE             
NEGATIVE        

 

 Urine urobilinogen measurement by automated test strip (mass/volume)          
  NORMAL             NORMAL        

 

 Urine leukocyte esterase detection by dipstick            1+             
NEGATIVE        

 

 Automated urine sediment erythrocyte count by microscopy (number/high power 
field)            > [HPF]            NRG        

 

 Automated urine sediment leukocyte count by microscopy (number/high power field
)             [HPF]            NRG        

 

 Bacteria detection in urine sediment by light microscopy            TRACE     
        NRG        

 

 Squamous epithelial cells detection in urine sediment by light microscopy     
       2-5             NRG        

 

 Crystals detection in urine sediment by light microscopy            NONE      
       NRG        

 

 Casts detection in urine sediment by light microscopy            NONE         
    NRG        

 

 Mucus detection in urine sediment by light microscopy            SMALL        
     NRG        

 

 Complete urinalysis with reflex to culture            NO             NRG      
  



                      



Encounters

      





 ACCT No.            Visit Date/Time            Discharge            Status    
        Pt. Type            Provider            Facility            Loc./Unit  
          Complaint        

 

 [id number]            2014 14:24:00            2014 23:59:59          
  CLS            Outpatient            ANJU SPRAGUE RAVEN                  
                             

 

 [id number]            2014 13:39:00            2014 23:59:59          
  CLS            Outpatient            JOSE ELIAS LAWTON           
                                    

 

 [id number]            2018 22:41:00            2018 00:11:00    
        DIS            Outpatient            KAN SOLORIO, ANUEL HO          
  [facility]            ER            PAIN IN UTERUS 
SHOOTING UP LOWER BACK        

 

 [id number]            2018 10:06:00            2018 12:12:00    
        DIS            Emergency            ANSHU THAKUR            [facility]            ER            COUGH/SOA        

 

 [id number]            2018 03:18:00            2018 04:10:00    
        DIS            Emergency            KAN SOLORIO, ANUEL HO            
[facility]            ER            PELVIC PAIN        

 

 [id number]            06/10/2018 16:12:00            06/10/2018 17:40:00    
        DIS            Emergency            TABBY GARCIA            [facility]            ER            RECTAL BLEEDING        

 

 [id number]            2013 15:20:00            2013 23:59:59    
        CLS            Outpatient                                              
              

 

 [id number]            2018 08:20:00            2018 23:59:59            
CLS            Outpatient            TORRI NIDIA JAMES                         
[facility]                     

 

 [id number]            2018 18:18:00                         ACT            
Unknown

## 2018-12-31 NOTE — PROGRESS NOTE-PRE OPERATIVE
Pre-Operative Progress Note


H&P Reviewed


The H&P was reviewed, patient examined and no changes noted.


Date Seen by Provider:  Dec 31, 2018


Time Seen by Provider:  10:00


Date H&P Reviewed:  Dec 31, 2018


Time H&P Reviewed:  10:00


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











MASSIEL BECKER MD Dec 31, 2018 10:57

## 2018-12-31 NOTE — XMS REPORT
Continuity of Care Document

 Created on: 2018



MARIE BARNETT

External Reference #: [id number]

: 1995

Sex: Female



Demographics







 Address  [address], [place]  [address]

 

 Home Phone  [phone] x

 

 Preferred Language  Unknown

 

 Marital Status  Unknown

 

 Jainism Affiliation  Unknown

 

 Race  Unknown

 

 Ethnic Group  Unknown





Author







 Author  [facility]

 

 Address  Unknown

 

 Phone  Unavailable



              



Allergies

      





 Active            Description            Code            Type            
Severity            Reaction            Onset            Reported/Identified   
         Relationship to Patient            Clinical Status        

 

 Yes            No Known Drug Allergies            [id number]            Drug 
Allergy            Mild            N/A                         2009      
                            



                  



Medications

      



There is no data.                  



Problems

      





 Date Dx Coded            Attending            Type            Code            
Diagnosis            Diagnosed By        

 

 2014            JOSE ELIAS LAWTON                         
477.9            ALLERGIC RHINITIS CAUSE UNSPECIFIED                     

 

 2014            JOSE ELIAS LAWTON N                         
478.19            OTHER DISEASES OF NASAL CAVITY AND SINUSES                   
  

 

 2014            ANJU SPRAGUE A                         477.9    
        ALLERGIC RHINITIS CAUSE UNSPECIFIED                     

 

 2014            ANJU SPRAGUE A                         478.19   
         OTHER DISEASES OF NASAL CAVITY AND SINUSES                     

 

 2014            ANJU SPRAGUE A                         V25.01   
         CONTRACEPTION - ORAL CONTRACEPTION                     

 

 2014            ANJU SPRAGUE A                         V65.45   
         STD COUNSELING                     

 

 2014            ANJU SPRAGUE A                         V72.41   
         PREGNANCY TEST NEGATIVE RESULT                     

 

 06/10/2018            TABBY GARCIAP            Ot            F17.210         
   NICOTINE DEPENDENCE, CIGARETTES, UNCOMPL                     

 

 06/10/2018            TABBY GARCIAP            Ot            F32.9            
MAJOR DEPRESSIVE DISORDER, SINGLE EPISOD                     

 

 06/10/2018            TABBY GARCIA ARNP            Ot            F41.9            
ANXIETY DISORDER, UNSPECIFIED                     

 

 06/10/2018            TABBY GARCIAP            Ot            G47.9            
SLEEP DISORDER, UNSPECIFIED                     

 

 06/10/2018            TABBY GARCIA ARNP            Ot            K62.89          
  OTHER SPECIFIED DISEASES OF ANUS AND REC                     

 

 06/10/2018            TABBY GARCIAP            Ot            K64.9            
UNSPECIFIED HEMORRHOIDS                     

 

 2018            TABBY GARCIA ARNP            Ot            F17.210         
   NICOTINE DEPENDENCE, CIGARETTES, UNCOMPL                     

 

 2018            RADHA TABBY ARNP            Ot            F32.9            
MAJOR DEPRESSIVE DISORDER, SINGLE EPISOD                     

 

 2018            RADHA TABBY ARNP            Ot            F41.9            
ANXIETY DISORDER, UNSPECIFIED                     

 

 2018            RADHA, TABBY ARNP            Ot            G47.9            
SLEEP DISORDER, UNSPECIFIED                     

 

 2018            RADHA, TABBY ARNP            Ot            K62.89          
  OTHER SPECIFIED DISEASES OF ANUS AND REC                     

 

 2018            RADHA, TABBY ARNP            Ot            K64.9            
UNSPECIFIED HEMORRHOIDS                     

 

 2018            RADHA, TABBY ARNP            Ot            F17.210         
   NICOTINE DEPENDENCE, CIGARETTES, UNCOMPL                     

 

 2018            RADHA, TABBY ARNP            Ot            F32.9            
MAJOR DEPRESSIVE DISORDER, SINGLE EPISOD                     

 

 2018            RADHA, TABBY ARNP            Ot            F41.9            
ANXIETY DISORDER, UNSPECIFIED                     

 

 2018            RADHA, TABBY ARNP            Ot            G47.9            
SLEEP DISORDER, UNSPECIFIED                     

 

 2018            RADHA, TABBY ARNP            Ot            K62.89          
  OTHER SPECIFIED DISEASES OF ANUS AND REC                     

 

 2018            RADHA, TABBY ARNP            Ot            K64.9            
UNSPECIFIED HEMORRHOIDS                     

 

 2018            KAN SOLORIO, ANUEL T            Ot            
F17.210            NICOTINE DEPENDENCE, CIGARETTES, UNCOMPL                     

 

 2018            ANUEL OMER MD T            Ot            F32.9 
           MAJOR DEPRESSIVE DISORDER, SINGLE EPISOD                     

 

 2018            ANUEL OMER MD            Ot            F41.9 
           ANXIETY DISORDER, UNSPECIFIED                     

 

 2018            ANUEL OMER MD T            Ot            F43.10
            POST-TRAUMATIC STRESS DISORDER, UNSPECIF                     

 

 2018            ANUEL OMER MD T            Ot            N39.0 
           URINARY TRACT INFECTION, SITE NOT SPECIF                     

 

 2018            ANUEL OMER MD T            Ot            R10.2 
           PELVIC AND PERINEAL PAIN                     

 

 2018            ANUEL OMER MD T            Ot            
F17.210            NICOTINE DEPENDENCE, CIGARETTES, UNCOMPL                     

 

 2018            ANUEL OMER MD            Ot            F32.9 
           MAJOR DEPRESSIVE DISORDER, SINGLE EPISOD                     

 

 2018            ANUEL OMER MD T            Ot            F41.9 
           ANXIETY DISORDER, UNSPECIFIED                     

 

 2018            ANUEL OMER MD T            Ot            F43.10
            POST-TRAUMATIC STRESS DISORDER, UNSPECIF                     

 

 2018            ANUEL OMER MD T            Ot            N39.0 
           URINARY TRACT INFECTION, SITE NOT SPECIF                     

 

 2018            KAN SOLORIO, ANUEL HO            Ot            R10.2 
           PELVIC AND PERINEAL PAIN                     

 

 2018            BERNOT, ANSHU            Ot            F17.210         
   NICOTINE DEPENDENCE, CIGARETTES, UNCOMPL                     

 

 2018            BERNOT ANSHU            Ot            F32.9            
MAJOR DEPRESSIVE DISORDER, SINGLE EPISOD                     

 

 2018            BERNOT, ANSHU            Ot            F41.9            
ANXIETY DISORDER, UNSPECIFIED                     

 

 2018            BERNOT, ANSHU            Ot            F43.10          
  POST-TRAUMATIC STRESS DISORDER, UNSPECIF                     

 

 2018            BERNOT, ANSHU            Ot            J06.9            
ACUTE UPPER RESPIRATORY INFECTION, UNSPE                     

 

 2018            BERNOT, ANSHU            Ot            R05            
COUGH                     

 

 2018            BERNOT, ANSHU            Ot            Z79.52          
  LONG TERM (CURRENT) USE OF SYSTEMIC STER                     

 

 2018            JEANETHOT, ANSHU            Ot            F17.210         
   NICOTINE DEPENDENCE, CIGARETTES, UNCOMPL                     

 

 2018            OFE ANSHU            Ot            F32.9            
MAJOR DEPRESSIVE DISORDER, SINGLE EPISOD                     

 

 2018            BERNOT ANSHU            Ot            F41.9            
ANXIETY DISORDER, UNSPECIFIED                     

 

 2018            BERNOT, ANSHU            Ot            F43.10          
  POST-TRAUMATIC STRESS DISORDER, UNSPECIF                     

 

 2018            BERNOT, ANSHU            Ot            J06.9            
ACUTE UPPER RESPIRATORY INFECTION, UNSPE                     

 

 2018            BERNOT, ANSHU            Ot            R05            
COUGH                     

 

 2018            BERNOT, ANSHU            Ot            Z79.52          
  LONG TERM (CURRENT) USE OF SYSTEMIC STER                     

 

 2018            BERNOT, ANSHU            Ot            F17.210         
   NICOTINE DEPENDENCE, CIGARETTES, UNCOMPL                     

 

 2018            OFE ANSHU            Ot            F32.9            
MAJOR DEPRESSIVE DISORDER, SINGLE EPISOD                     

 

 2018            BERNOT, ANSHU            Ot            F41.9            
ANXIETY DISORDER, UNSPECIFIED                     

 

 2018            BERNOT, ANSHU            Ot            F43.10          
  POST-TRAUMATIC STRESS DISORDER, UNSPECIF                     

 

 2018            BERNOT ANSHU            Ot            J06.9            
ACUTE UPPER RESPIRATORY INFECTION, UNSPE                     

 

 2018            BERNOT, ANSHU            Ot            R05            
COUGH                     

 

 2018            BERNOT, ANSHU            Ot            Z79.52          
  LONG TERM (CURRENT) USE OF SYSTEMIC STER                     

 

 11/15/2018            KAN SOLORIO, ANUEL HO            Ot            
F17.210            NICOTINE DEPENDENCE, CIGARETTES, UNCOMPL                     

 

 11/15/2018            ANUEL OMER MD, Ot            F32.9 
           MAJOR DEPRESSIVE DISORDER, SINGLE EPISOD                     

 

 11/15/2018            ANUEL OMER MD, Ot            F41.9 
           ANXIETY DISORDER, UNSPECIFIED                     

 

 11/15/2018            ANUEL OMER MD, Ot            F43.10
            POST-TRAUMATIC STRESS DISORDER, UNSPECIF                     

 

 11/15/2018            ANUEL OMER MD, Ot            N94.6 
           DYSMENORRHEA, UNSPECIFIED                     

 

 11/15/2018            ANUEL OMER MD, Ot            R10.2 
           PELVIC AND PERINEAL PAIN                     

 

 11/15/2018            ANUEL OMER MD, Ot            Z79.52
            LONG TERM (CURRENT) USE OF SYSTEMIC STER                     



                                                                               
                                                                   



Procedures

      





 Code            Description            Performed By            Performed On   
     

 

             36996                                  PREGNANCY TEST, URINE (IN-
HOUSE)                                   2014        

 

             05950                                  STREP A  (IN-HOUSE)        
                           2014        

 

             07650                                  PREGNANCY TEST, URINE (IN-
HOUSE)                                   2014        



                      



Results

      





 Test            Result            Range        









 Complete urinalysis with reflex to culture - 06/10/18 16:49         









 Urine color determination            YELLOW             NRG        

 

 Urine clarity determination            VERY CLOUDY             NRG        

 

 Urine pH measurement by test strip            7             5-9        

 

 Specific gravity of urine by test strip            1.010             1.016-
1.022        

 

 Urine protein assay by test strip, semi-quantitative            NEGATIVE      
       NEGATIVE        

 

 Urine glucose detection by automated test strip            NEGATIVE           
  NEGATIVE        

 

 Erythrocytes detection in urine sediment by light microscopy            3+    
         NEGATIVE        

 

 Urine ketones detection by automated test strip            NEGATIVE           
  NEGATIVE        

 

 Urine nitrite detection by test strip            NEGATIVE             NEGATIVE
        

 

 Urine total bilirubin detection by test strip            NEGATIVE             
NEGATIVE        

 

 Urine urobilinogen measurement by automated test strip (mass/volume)          
  NORMAL             NORMAL        

 

 Urine leukocyte esterase detection by dipstick            3+             
NEGATIVE        

 

 Automated urine sediment erythrocyte count by microscopy (number/high power 
field)            RARE             NRG        

 

 Automated urine sediment leukocyte count by microscopy (number/high power field
)             [HPF]            NRG        

 

 Bacteria detection in urine sediment by light microscopy            NEGATIVE  
           NRG        

 

 Squamous epithelial cells detection in urine sediment by light microscopy     
       10-25             NRG        

 

 Crystals detection in urine sediment by light microscopy            PRESENT   
          NRG        

 

 Casts detection in urine sediment by light microscopy            NONE         
    NRG        

 

 Mucus detection in urine sediment by light microscopy            NEGATIVE     
        NRG        

 

 Complete urinalysis with reflex to culture            NO             NRG      
  

 

 Amorphous sediment detection in urine sediment by light microscopy            
LARGE EMILIANA URATES             NRG        

 

 Renal epithelial cells detection in urine sediment by light microscopy        
    NONE             NRG        









 Complete urinalysis with reflex to culture - 18 03:30         









 Urine color determination            BIJU             NRG        

 

 Urine clarity determination            SLIGHTLY CLOUDY             NRG        

 

 Urine pH measurement by test strip            5             5-9        

 

 Specific gravity of urine by test strip            1.030             1.016-
1.022        

 

 Urine protein assay by test strip, semi-quantitative            2+             
NEGATIVE        

 

 Urine glucose detection by automated test strip            NEGATIVE           
  NEGATIVE        

 

 Erythrocytes detection in urine sediment by light microscopy            5+    
         NEGATIVE        

 

 Urine ketones detection by automated test strip            1+             
NEGATIVE        

 

 Urine nitrite detection by test strip            NEGATIVE             NEGATIVE
        

 

 Urine total bilirubin detection by test strip            NEGATIVE             
NEGATIVE        

 

 Urine urobilinogen measurement by automated test strip (mass/volume)          
  1 mg/dL            NORMAL        

 

 Urine leukocyte esterase detection by dipstick            2+             
NEGATIVE        

 

 Automated urine sediment erythrocyte count by microscopy (number/high power 
field)            > [HPF]            NRG        

 

 Automated urine sediment leukocyte count by microscopy (number/high power field
)             [HPF]            NRG        

 

 Bacteria detection in urine sediment by light microscopy            MODERATE  
           NRG        

 

 Squamous epithelial cells detection in urine sediment by light microscopy     
       2-5             NRG        

 

 Crystals detection in urine sediment by light microscopy            NONE      
       NRG        

 

 Casts detection in urine sediment by light microscopy            NONE         
    NRG        

 

 Mucus detection in urine sediment by light microscopy            NEGATIVE     
        NRG        

 

 Complete urinalysis with reflex to culture            YES             NRG     
   









 Bacterial urine culture - 18 03:30         









 Bacterial urine culture            SEE COMMEN             NRG        

 

 COLONY COUNT            .             NRG        









 Complete urinalysis with reflex to culture - 18 23:20         









 Urine color determination            YELLOW             NRG        

 

 Urine clarity determination            SLIGHTLY CLOUDY             NRG        

 

 Urine pH measurement by test strip            7             5-9        

 

 Specific gravity of urine by test strip            1.010             1.016-
1.022        

 

 Urine protein assay by test strip, semi-quantitative            NEGATIVE      
       NEGATIVE        

 

 Urine glucose detection by automated test strip            NEGATIVE           
  NEGATIVE        

 

 Erythrocytes detection in urine sediment by light microscopy            5+    
         NEGATIVE        

 

 Urine ketones detection by automated test strip            NEGATIVE           
  NEGATIVE        

 

 Urine nitrite detection by test strip            NEGATIVE             NEGATIVE
        

 

 Urine total bilirubin detection by test strip            NEGATIVE             
NEGATIVE        

 

 Urine urobilinogen measurement by automated test strip (mass/volume)          
  NORMAL             NORMAL        

 

 Urine leukocyte esterase detection by dipstick            1+             
NEGATIVE        

 

 Automated urine sediment erythrocyte count by microscopy (number/high power 
field)            > [HPF]            NRG        

 

 Automated urine sediment leukocyte count by microscopy (number/high power field
)             [HPF]            NRG        

 

 Bacteria detection in urine sediment by light microscopy            TRACE     
        NRG        

 

 Squamous epithelial cells detection in urine sediment by light microscopy     
       2-5             NRG        

 

 Crystals detection in urine sediment by light microscopy            NONE      
       NRG        

 

 Casts detection in urine sediment by light microscopy            NONE         
    NRG        

 

 Mucus detection in urine sediment by light microscopy            SMALL        
     NRG        

 

 Complete urinalysis with reflex to culture            NO             NRG      
  



                      



Encounters

      





 ACCT No.            Visit Date/Time            Discharge            Status    
        Pt. Type            Provider            Facility            Loc./Unit  
          Complaint        

 

 [id number]            2014 14:24:00            2014 23:59:59          
  CLS            Outpatient            ANJU SPRAGUE RAVEN                  
                             

 

 [id number]            2014 13:39:00            2014 23:59:59          
  CLS            Outpatient            JOSE ELIAS LAWTON           
                                    

 

 [id number]            2018 22:41:00            2018 00:11:00    
        DIS            Outpatient            KAN SOLORIO, ANUEL HO          
  [facility]            ER            PAIN IN UTERUS 
SHOOTING UP LOWER BACK        

 

 [id number]            2018 10:06:00            2018 12:12:00    
        DIS            Emergency            ANSHU THAKUR            [facility]            ER            COUGH/SOA        

 

 [id number]            2018 03:18:00            2018 04:10:00    
        DIS            Emergency            KAN SOLOROI, ANUEL HO            
[facility]            ER            PELVIC PAIN        

 

 [id number]            06/10/2018 16:12:00            06/10/2018 17:40:00    
        DIS            Emergency            TABBY GARCIA            [facility] 
[facility]            ER            RECTAL BLEEDING        

 

 [id number]            2013 15:20:00            2013 23:59:59    
        CLS            Outpatient                                              
              

 

 [id number]            2018 08:20:00            2018 23:59:59            
CLS            Outpatient            TORRI NIDIA JAMES                         
[facility]                     

 

 [id number]            2018 18:18:00                         [place]            
Unknown

## 2018-12-31 NOTE — ED ABDOMINAL PAIN
General


Chief Complaint:  Abdominal/GI Problems


Stated Complaint:  STOMACH PAIN;BACK PAIN


Nursing Triage Note:  


Pt c/o RUQ abd pain starting at 2100 last night


Sepsis Screen:  No Definite Risk


Source of Information:  Patient


Exam Limitations:  No Limitations


 (ANUEL OMER MD)





History of Present Illness


Date Seen by Provider:  Dec 31, 2018


Time Seen by Provider:  04:58


Initial Comments


This 23-year-old woman presents to the emergency room with complaints of fairly 

sudden onset of sharp right flank and right upper quadrant pain radiating into 

her back.  She has had associated nausea and vomiting.  She is no longer 

nauseous now.  Time of onset was approximately 21:00.  She denies pregnancy as 

she has not had intercourse since her LMP 3 weeks ago.  She denies constipation

, diarrhea, fever, or urinary changes.  She reports having one prior episode of 

similar pain that was less intense.  She denies any history of gallbladder 

disease or renal stones.  She rates her pain as 9/10.


 (ANUEL OMER MD)





Allergies and Home Medications


Allergies


Coded Allergies:  


     No Known Drug Allergies (Unverified , 8/16/09)





Home Medications


Azithromycin 250 Mg Tablet, 250 MG PO UD


   TAKE 2 TABLETS TODAY, THEN TAKE 1 TABLET DAILY FOR 4 MORE DAYS 


   Prescribed by: ANSHU THAKUR on 9/18/18 1120


Hyoscyamine Sulfate 0.125 Mg Tab.subl, 0.125 MG SL Q4H PRN for CRAMPS


   Prescribed by: ANUEL LEONE on 11/14/18 0004


Prednisone 10 Mg Tab.ds.pk, 10 MG PO UD


   Prescribed by: ANSHU THAKUR on 9/18/18 1120





Patient Home Medication List


Home Medication List Reviewed:  Yes


 (ANUEL OMER MD)





Review of Systems


Review of Systems


Constitutional:  no symptoms reported


EENTM:  No Symptoms Reported


Respiratory:  No Symptoms Reported


Cardiovascular:  No Symptoms Reported


Gastrointestinal:  See HPI


Genitourinary:  No Symptoms Reported


Musculoskeletal:  no symptoms reported


Skin:  no symptoms reported


Psychiatric/Neurological:  No Symptoms Reported


Endocrine:  No Symptoms Reported


Hematologic/Lymphatic:  No Symptoms Reported (ANUEL OMER MD)





Past Medical-Social-Family Hx


Patient Social History


Alcohol Use:  Rarely Uses


Recreational Drug Use:  No


Type Used:  Cigarettes


2nd Hand Smoke Exposure:  No


Recent Foreign Travel:  No


Contact w/Someone Who Travel:  No


Recent Infectious Disease Expo:  No


Recent Hopitalizations:  No


 (ANUEL OMER MD)





Immunizations Up To Date


Tetanus Booster (TDap):  Unknown


 (ANUEL OMER MD)





Seasonal Allergies


Seasonal Allergies:  No


 (ANUEL OMER MD)





Past Medical History


Surgeries:  No


Respiratory:  No


Cardiac:  No


Neurological:  No


Pregnant:  No


Last Menstrual Period:  Dec 10, 2018


Female Reproductive Disorders:  Menstrual Problems


Genitourinary:  No


Gastrointestinal:  No


Musculoskeletal:  No


Endocrine:  No


HEENT:  No


Cancer:  No


Psychosocial:  Yes


Sleep Difficulties, Anxiety, PTSD, Depression


Integumentary:  No


Blood Disorders:  No


Adverse Reaction/Blood Tranf:  No


 (ANUEL OMER MD)





Physical Exam


Vital Signs





Vital Signs - First Documented








 12/31/18





 05:12


 


Temp 97.0


 


Pulse 78


 


Resp 18


 


B/P (MAP) 133/92 (106)


 


Pulse Ox 98


 


O2 Delivery Room Air





 (NAPOLEON HOFFMAN MD)


Vital Signs


Capillary Refill : Less Than 3 Seconds 


 (ANUEL OMER MD)


Height/Weight/BMI


Height: 5'8.00"


Weight: 200lbs. oz. 90.150766ow;  BMI


Method:Stated


General Appearance:  WD/WN, mild distress


HEENT:  PERRL/EOMI, normal ENT inspection


Neck:  normal inspection


Respiratory:  lungs clear, normal breath sounds, no respiratory distress, no 

accessory muscle use


Cardiovascular:  regular rate, rhythm, no edema, no murmur


Gastrointestinal:  normal bowel sounds, soft, tenderness (epigastrium and right 

upper quadrant)


Extremities:  normal inspection, no pedal edema


Back:  CVA tenderness (R)


Neurologic/Psychiatric:  CNs II-XII nml as tested, no motor/sensory deficits, 

alert, normal mood/affect, oriented x 3


Skin:  normal color, warm/dry (ANUEL OMER MD)





Progress/Results/Core Measures


Results/Orders


Lab Results





Laboratory Tests








Test


 12/31/18


05:45 12/31/18


06:00 Range/Units


 


 


Urine Color YELLOW    


 


Urine Clarity VERY CLOUDY H   


 


Urine pH 7   5-9  


 


Urine Specific Gravity 1.010 L  1.016-1.022  


 


Urine Protein NEGATIVE   NEGATIVE  


 


Urine Glucose (UA) NEGATIVE   NEGATIVE  


 


Urine Ketones NEGATIVE   NEGATIVE  


 


Urine Nitrite NEGATIVE   NEGATIVE  


 


Urine Bilirubin NEGATIVE   NEGATIVE  


 


Urine Urobilinogen NORMAL   NORMAL  MG/DL


 


Urine Leukocyte Esterase 1+ H  NEGATIVE  


 


Urine RBC (Auto) NEGATIVE   NEGATIVE  


 


Urine RBC NONE    /HPF


 


Urine WBC RARE    /HPF


 


Urine Squamous Epithelial


Cells RARE 


 


  /HPF





 


Urine Crystals PRESENT H   /LPF


 


Urine Amorphous Sediment


 LARGE EMILIANA


URATES H 


  /LPF





 


Urine Bacteria TRACE    /HPF


 


Urine Casts NONE    /LPF


 


Urine Mucus NEGATIVE    /LPF


 


Urine Culture Indicated NO    


 


White Blood Count


 


 10.7 


 4.3-11.0


10^3/uL


 


Red Blood Count


 


 4.81 


 4.35-5.85


10^6/uL


 


Hemoglobin  13.7  11.5-16.0  G/DL


 


Hematocrit  40  35-52  %


 


Mean Corpuscular Volume  84  80-99  FL


 


Mean Corpuscular Hemoglobin  29  25-34  PG


 


Mean Corpuscular Hemoglobin


Concent 


 34 


 32-36  G/DL





 


Red Cell Distribution Width  12.5  10.0-14.5  %


 


Platelet Count


 


 324 


 130-400


10^3/uL


 


Mean Platelet Volume  10.4  7.4-10.4  FL


 


Neutrophils (%) (Auto)  60  42-75  %


 


Lymphocytes (%) (Auto)  29  12-44  %


 


Monocytes (%) (Auto)  9  0-12  %


 


Eosinophils (%) (Auto)  2  0-10  %


 


Basophils (%) (Auto)  0  0-10  %


 


Neutrophils # (Auto)  6.4  1.8-7.8  X 10^3


 


Lymphocytes # (Auto)  3.1  1.0-4.0  X 10^3


 


Monocytes # (Auto)  0.9  0.0-1.0  X 10^3


 


Eosinophils # (Auto)


 


 0.2 


 0.0-0.3


10^3/uL


 


Basophils # (Auto)


 


 0.0 


 0.0-0.1


10^3/uL


 


Sodium Level  142  135-145  MMOL/L


 


Potassium Level  4.0  3.6-5.0  MMOL/L


 


Chloride Level  108 H   MMOL/L


 


Carbon Dioxide Level  22  21-32  MMOL/L


 


Anion Gap  12  5-14  MMOL/L


 


Blood Urea Nitrogen  12  7-18  MG/DL


 


Creatinine


 


 0.82 


 0.60-1.30


MG/DL


 


Estimat Glomerular Filtration


Rate 


 > 60 


  





 


BUN/Creatinine Ratio  15   


 


Glucose Level  116 H   MG/DL


 


Calcium Level  9.8  8.5-10.1  MG/DL


 


Corrected Calcium  9.4  8.5-10.1  MG/DL


 


Total Bilirubin  0.3  0.1-1.0  MG/DL


 


Aspartate Amino Transf


(AST/SGOT) 


 15 


 5-34  U/L





 


Alanine Aminotransferase


(ALT/SGPT) 


 14 


 0-55  U/L





 


Alkaline Phosphatase  94    U/L


 


Total Protein  7.2  6.4-8.2  GM/DL


 


Albumin  4.5  3.2-4.5  GM/DL


 


Lipase  26  8-78  U/L


 


Serum Pregnancy Test,


Qualitative 


 NEGATIVE 


 NEGATIVE  








 (NAPOLEON HOFFMAN MD)


My Orders





Orders - NAPOLEON HOFFMAN MD


Fentanyl  Injection (Sublimaze Injection (12/31/18 06:35)


 (NAPOLEON HOFFMAN MD)


Medications Given in ED





Current Medications








 Medications  Dose


 Ordered  Sig/Nicola


 Route  Start Time


 Stop Time Status Last Admin


Dose Admin


 


 Fentanyl Citrate  50 mcg  ONCE  ONCE


 IVP  12/31/18 05:45


 12/31/18 05:46 DC 12/31/18 06:13


50 MCG


 


 Sodium Chloride  1,000 ml @ 


 0 mls/hr  Q0M ONCE


 IV  12/31/18 05:31


 12/31/18 05:33 DC 12/31/18 06:13


1,000 MLS/HR





 (NAPOLEON HOFFMAN MD)


Vital Signs/I&O











 12/31/18





 05:12


 


Temp 97.0


 


Pulse 78


 


Resp 18


 


B/P (MAP) 133/92 (106)


 


Pulse Ox 98


 


O2 Delivery Room Air





 (NAPOLEON HOFFMAN MD)








Blood Pressure Mean:  106











Progress


Progress Note :  


   Time:  05:37


Progress Note


Patient seen and examined.  Labs were ordered.  IV fluids will be administered.

  Fentanyl was ordered for pain.  Anticipate obtaining a gallbladder ultrasound 

versus CT scan with renal stone rule out.


 (ANUEL OMER MD)


Progress Note :  


Progress Note


0645: Assumed care from Dr. Leone.  UA does not show blood.  Ultrasound 

will be ordered.  Patient was complaining of some continued pain.  She is 

currently in the bed with her significant other splinting.  She was given 

fentanyl 50 g IV.  Ultrasound will be available at 8 a.m.  No other 

significant changes currently.  0805: Patient is an ultrasound.  No significant 

abnormalities on labs.  Pain is improved since last dose medicine.  Monitor 

patient.  0827: I did discuss the case with Dr. Hunter as patient does have 2 cm 

stone as well as some gallbladder wall thickening.  He will take her for 

surgery today.  I did discuss this with patient and significant other who 

agree.  0834: Anticipate same day surgery procedure today.  Dr. Hunter agrees.


 (NAPOLEON HOFFMAN MD)





Diagnostic Imaging





   Diagonstic Imaging:  Ultrasound


   Plain Films/CT/US/NM/MRI:  abdomen


Comments


2 cm Gallstones with mild gallbladder wall thickening preliminary per report.


   Reviewed:  Reviewed by Me


 (NAPOLEON HOFFMAN MD)





Departure


Communication (Admissions)


Time/Spoke to Admitting Phy:  08:27


 (NAPOLEON HOFFMAN MD)





Impression





 Primary Impression:  


 Cholelithiasis


 Qualified Codes:  K80.01 - Calculus of gallbladder with acute cholecystitis 

with obstruction


Disposition:  09 ADMITTED AS INPATIENT


Condition:  Stable





Admissions


Decision to Admit Reason:  Admit from ER (General)


Decision to Admit/Date:  Dec 31, 2018


Time/Decision to Admit Time:  08:27


 (NAPOLEON HOFFMAN MD)





Departure-Patient Inst.


Referrals:  


GEOFFREY LUCIA DO (PCP/Family)


Primary Care Physician











ANUEL OMER MD Dec 31, 2018 05:38


NAPOLEON HOFFMAN MD Dec 31, 2018 08:08

## 2019-01-01 NOTE — OPERATIVE REPORT
DATE OF SERVICE:  12/31/2018



ATTENDING PRIMARY CARE PHYSICIAN:

Beau Fernandes DO



PREOPERATIVE DIAGNOSIS:

Acute on chronic calculous cholecystitis.



POSTOPERATIVE DIAGNOSIS:

Acute on chronic calculous cholecystitis.



PROCEDURE:

Laparoscopic cholecystectomy.



SURGEON:

Massiel Becker MD



ASSISTANT:

BG Mcneal



ANESTHESIA:

General endotracheal.



ESTIMATED BLOOD LOSS:

Minimal.



FINDINGS:

Dilated gallbladder with moderate gallbladder wall thickening and one large

solitary stone.



DISPOSITION:

The patient tolerated the procedure well.



INDICATIONS:

The patient is a 23-year-old female who presented to the Emergency Department

early this morning with acute onset of right upper abdominal quadrant pain with

radiation towards the back with associated nausea; however, no vomiting.  She

reports that the pain worsened in severity.  She reports that she may have had

some similar symptoms in the past before; however, not severe.  An ultrasound

was performed, which did show a large gallstone as well as gallbladder wall

inflammation consistent with an acute on chronic calculous cholecystitis.



DESCRIPTION OF PROCEDURE:

The patient was brought to the operating room, laid supine on the table.  After

adequate IV pain and sedating medications and general endotracheal intubation,

the abdomen was prepped and draped in standard surgical fashion.



A 0.5% Marcaine with epinephrine was used to anesthetize the overlying skin and

a left upper abdominal quadrant skin incision was made using a 15 blade.  An 0

silk suture was applied to the medial aspect of the incision for retraction and

a Veress needle inserted with low opening pressure of 0 mmHg and the abdomen was

then insufflated to 15 mmHg pressure.  The Veress needle removed and a 5 mm Xcel

trocar placed followed by a 5 mm 45-degree angle laparoscope visualizing the

peritoneal cavity.



A 4-quadrant abdominal exploration was performed.  There was a dilated

gallbladder with moderate gallbladder wall thickening.  What was visualized of

the liver, omentum, small bowel, stomach appeared normal.  Under direct

visualization, we then proceed to place a supraumbilical 10 mm port after the

skin and peritoneal lining were anesthetized using 0.5% Marcaine with

epinephrine and a transverse skin incision made using a 15 blade.  In a similar

fashion, a right upper abdominal quadrant 5 mm port was placed.



The patient was then placed in reverse Trendelenburg position as well as plane

right side up, left side down.  The fundus of the gallbladder was then retracted

anteriorly and superiorly.  The hepatoduodenal ligament was then opened using

blunt dissection as well as electrocautery on the hook instrument.  The entire

critical view of safety was identified including the cystic duct and artery as

the only two structures going into the gallbladder, the triangle of Calot as

well as the cystic plate behind the proximal gallbladder.  A timeout was then

taken and the cystic duct and artery were then clipped proximally and distally

and cut with EndoShears.  Good hemostasis was observed.  The gallbladder was

then dissected off the liver bed using cautery on hook instrument with

visualization of good hemostasis as well as no leaking ducts of Luschka.  The

gallbladder was removed through the 10 mm port site using an EndoCatch bag.



A 10 mm port site fascia and peritoneum were then closed under direct

visualization using a Cirilo-Sarah device and an 0 Vicryl suture.  The

abdomen was desufflated and the remaining ports removed.  All skin incisions

were closed using 4-0 Monocryl running subcuticular sutures.  Wounds were then

cleaned and covered with Dermabond.



The patient tolerated the procedure well.  We will start IV normal pain

medication as well as a clear liquid diet.  Once she is tolerating clears, has

good pain control with oral pain medications, ambulating well, we will discharge

her home.  We will have followup in 2 weeks.





Job ID: [id number]

DocumentID: [id number]

Dictated Date:  12/31/2018 14:18:39

Transcription Date: 01/01/2019 03:11:16

Dictated By: MASSIEL BECKER MD

## 2019-01-18 ENCOUNTER — HOSPITAL ENCOUNTER (EMERGENCY)
Dept: HOSPITAL 75 - ER | Age: 24
Discharge: HOME | End: 2019-01-18
Payer: MEDICAID

## 2019-01-18 VITALS — BODY MASS INDEX: 36.37 KG/M2 | WEIGHT: 240 LBS | HEIGHT: 68 IN

## 2019-01-18 VITALS — DIASTOLIC BLOOD PRESSURE: 71 MMHG | SYSTOLIC BLOOD PRESSURE: 111 MMHG

## 2019-01-18 DIAGNOSIS — F43.10: ICD-10-CM

## 2019-01-18 DIAGNOSIS — F12.10: ICD-10-CM

## 2019-01-18 DIAGNOSIS — F41.9: ICD-10-CM

## 2019-01-18 DIAGNOSIS — Z79.52: ICD-10-CM

## 2019-01-18 DIAGNOSIS — F32.9: ICD-10-CM

## 2019-01-18 DIAGNOSIS — J40: Primary | ICD-10-CM

## 2019-01-18 LAB
BASOPHILS # BLD AUTO: 0 10^3/UL (ref 0–0.1)
BASOPHILS NFR BLD AUTO: 0 % (ref 0–10)
EOSINOPHIL # BLD AUTO: 0.2 10^3/UL (ref 0–0.3)
EOSINOPHIL NFR BLD AUTO: 2 % (ref 0–10)
ERYTHROCYTE [DISTWIDTH] IN BLOOD BY AUTOMATED COUNT: 12.2 % (ref 10–14.5)
HCT VFR BLD CALC: 38 % (ref 35–52)
HGB BLD-MCNC: 13.3 G/DL (ref 11.5–16)
LYMPHOCYTES # BLD AUTO: 2.6 X 10^3 (ref 1–4)
LYMPHOCYTES NFR BLD AUTO: 24 % (ref 12–44)
MANUAL DIFFERENTIAL PERFORMED BLD QL: NO
MCH RBC QN AUTO: 29 PG (ref 25–34)
MCHC RBC AUTO-ENTMCNC: 35 G/DL (ref 32–36)
MCV RBC AUTO: 83 FL (ref 80–99)
MONOCYTES # BLD AUTO: 0.8 X 10^3 (ref 0–1)
MONOCYTES NFR BLD AUTO: 7 % (ref 0–12)
NEUTROPHILS # BLD AUTO: 7.5 X 10^3 (ref 1.8–7.8)
NEUTROPHILS NFR BLD AUTO: 68 % (ref 42–75)
PLATELET # BLD: 385 10^3/UL (ref 130–400)
PMV BLD AUTO: 10.1 FL (ref 7.4–10.4)
RBC # BLD AUTO: 4.63 10^6/UL (ref 4.35–5.85)
WBC # BLD AUTO: 11 10^3/UL (ref 4.3–11)

## 2019-01-18 PROCEDURE — 71046 X-RAY EXAM CHEST 2 VIEWS: CPT

## 2019-01-18 PROCEDURE — 85025 COMPLETE CBC W/AUTO DIFF WBC: CPT

## 2019-01-18 PROCEDURE — 94640 AIRWAY INHALATION TREATMENT: CPT

## 2019-01-18 PROCEDURE — 36415 COLL VENOUS BLD VENIPUNCTURE: CPT

## 2019-01-18 PROCEDURE — 84703 CHORIONIC GONADOTROPIN ASSAY: CPT

## 2019-01-18 NOTE — DIAGNOSTIC IMAGING REPORT
INDICATION: 

Respiratory infection.



EXAMINATION:

PA and lateral chest.



FINDINGS:

The heart size and pulmonary vascularity are normal. The lungs

are clear. There are no effusions or pneumothoraces.



IMPRESSION: 

Negative chest.



Dictated by: 



  Dictated on workstation # [id number]

## 2019-01-18 NOTE — XMS REPORT
Continuity of Care Document

 Created on: 2019



MARIE BARNETT

External Reference #: [id number]

: 1995

Sex: Female



Demographics







 Address  [address]  [address]

 

 Home Phone  [phone] x

 

 Preferred Language  Unknown

 

 Marital Status  Unknown

 

 Amish Affiliation  Unknown

 

 Race  Unknown

 

 Ethnic Group  Unknown





Author







 Author  [facility]

 

 Address  Unknown

 

 Phone  Unavailable



              



Allergies

      





 Active            Description            Code            Type            
Severity            Reaction            Onset            Reported/Identified   
         Relationship to Patient            Clinical Status        

 

 Yes            No Known Drug Allergies            [id number]            Drug 
Allergy            Mild            N/A                         2009      
                            



                  



Medications

      



There is no data.                  



Problems

      





 Date Dx Coded            Attending            Type            Code            
Diagnosis            Diagnosed By        

 

 2014            JOSE ELIAS LAWTON                         
477.9            ALLERGIC RHINITIS CAUSE UNSPECIFIED                     

 

 2014            JOSE ELIAS LAWTON N                         
478.19            OTHER DISEASES OF NASAL CAVITY AND SINUSES                   
  

 

 2014            ANJU SPRAGUE A                         477.9    
        ALLERGIC RHINITIS CAUSE UNSPECIFIED                     

 

 2014            AJNU SPRAGUE A                         478.19   
         OTHER DISEASES OF NASAL CAVITY AND SINUSES                     

 

 2014            ANJU SPRAGUE A                         V25.01   
         CONTRACEPTION - ORAL CONTRACEPTION                     

 

 2014            ANJU SPRAGUE A                         V65.45   
         STD COUNSELING                     

 

 2014            ANJU SPRAGUE A                         V72.41   
         PREGNANCY TEST NEGATIVE RESULT                     

 

 06/10/2018            TABBY GARCIAP            Ot            F17.210         
   NICOTINE DEPENDENCE, CIGARETTES, UNCOMPL                     

 

 06/10/2018            TABBY GARCIAP            Ot            F32.9            
MAJOR DEPRESSIVE DISORDER, SINGLE EPISOD                     

 

 06/10/2018            TABBY GARCIA ARNP            Ot            F41.9            
ANXIETY DISORDER, UNSPECIFIED                     

 

 06/10/2018            TABBY GARCIAP            Ot            G47.9            
SLEEP DISORDER, UNSPECIFIED                     

 

 06/10/2018            TABBY GARCIA ARNP            Ot            K62.89          
  OTHER SPECIFIED DISEASES OF ANUS AND REC                     

 

 06/10/2018            TABBY GARCIAP            Ot            K64.9            
UNSPECIFIED HEMORRHOIDS                     

 

 2018            TABBY GARCIA ARNP            Ot            F17.210         
   NICOTINE DEPENDENCE, CIGARETTES, UNCOMPL                     

 

 2018            RADHA TABBY ARNP            Ot            F32.9            
MAJOR DEPRESSIVE DISORDER, SINGLE EPISOD                     

 

 2018            RADHA TABBY ARNP            Ot            F41.9            
ANXIETY DISORDER, UNSPECIFIED                     

 

 2018            RADHA, TABBY ARNP            Ot            G47.9            
SLEEP DISORDER, UNSPECIFIED                     

 

 2018            RADHA, TABBY ARNP            Ot            K62.89          
  OTHER SPECIFIED DISEASES OF ANUS AND REC                     

 

 2018            RADHA, TABBY ARNP            Ot            K64.9            
UNSPECIFIED HEMORRHOIDS                     

 

 2018            RADHA, TABBY ARNP            Ot            F17.210         
   NICOTINE DEPENDENCE, CIGARETTES, UNCOMPL                     

 

 2018            RADHA, TABBY ARNP            Ot            F32.9            
MAJOR DEPRESSIVE DISORDER, SINGLE EPISOD                     

 

 2018            RADHA, TABBY ARNP            Ot            F41.9            
ANXIETY DISORDER, UNSPECIFIED                     

 

 2018            RADHA, TABBY ARNP            Ot            G47.9            
SLEEP DISORDER, UNSPECIFIED                     

 

 2018            RADHA, TABBY ARNP            Ot            K62.89          
  OTHER SPECIFIED DISEASES OF ANUS AND REC                     

 

 2018            RADHA, TABBY ARNP            Ot            K64.9            
UNSPECIFIED HEMORRHOIDS                     

 

 2018            KAN SOLORIO, ANUEL T            Ot            
F17.210            NICOTINE DEPENDENCE, CIGARETTES, UNCOMPL                     

 

 2018            ANUEL OMER MD T            Ot            F32.9 
           MAJOR DEPRESSIVE DISORDER, SINGLE EPISOD                     

 

 2018            ANUEL OMER MD            Ot            F41.9 
           ANXIETY DISORDER, UNSPECIFIED                     

 

 2018            ANUEL OMER MD T            Ot            F43.10
            POST-TRAUMATIC STRESS DISORDER, UNSPECIF                     

 

 2018            ANUEL OMER MD T            Ot            N39.0 
           URINARY TRACT INFECTION, SITE NOT SPECIF                     

 

 2018            ANUEL OMER MD T            Ot            R10.2 
           PELVIC AND PERINEAL PAIN                     

 

 2018            ANUEL OMER MD T            Ot            
F17.210            NICOTINE DEPENDENCE, CIGARETTES, UNCOMPL                     

 

 2018            ANUEL OMER MD            Ot            F32.9 
           MAJOR DEPRESSIVE DISORDER, SINGLE EPISOD                     

 

 2018            ANUEL OMER MD T            Ot            F41.9 
           ANXIETY DISORDER, UNSPECIFIED                     

 

 2018            ANUEL OMER MD T            Ot            F43.10
            POST-TRAUMATIC STRESS DISORDER, UNSPECIF                     

 

 2018            ANUEL OMER MD T            Ot            N39.0 
           URINARY TRACT INFECTION, SITE NOT SPECIF                     

 

 2018            KAN SOLORIO, ANUEL HO            Ot            R10.2 
           PELVIC AND PERINEAL PAIN                     

 

 2018            BERNOT, ANSHU            Ot            F17.210         
   NICOTINE DEPENDENCE, CIGARETTES, UNCOMPL                     

 

 2018            BERNOT ANSHU            Ot            F32.9            
MAJOR DEPRESSIVE DISORDER, SINGLE EPISOD                     

 

 2018            BERNOT, ANSHU            Ot            F41.9            
ANXIETY DISORDER, UNSPECIFIED                     

 

 2018            BERNOT, ANSHU            Ot            F43.10          
  POST-TRAUMATIC STRESS DISORDER, UNSPECIF                     

 

 2018            BERNOT, ANSHU            Ot            J06.9            
ACUTE UPPER RESPIRATORY INFECTION, UNSPE                     

 

 2018            BERNOT, ANSHU            Ot            R05            
COUGH                     

 

 2018            BERNOT, ANSHU            Ot            Z79.52          
  LONG TERM (CURRENT) USE OF SYSTEMIC STER                     

 

 2018            JEANETHOT, ANSHU            Ot            F17.210         
   NICOTINE DEPENDENCE, CIGARETTES, UNCOMPL                     

 

 2018            OFE ANSHU            Ot            F32.9            
MAJOR DEPRESSIVE DISORDER, SINGLE EPISOD                     

 

 2018            BERNOT ANSHU            Ot            F41.9            
ANXIETY DISORDER, UNSPECIFIED                     

 

 2018            BERNOT, ANSHU            Ot            F43.10          
  POST-TRAUMATIC STRESS DISORDER, UNSPECIF                     

 

 2018            BERNOT, ANSHU            Ot            J06.9            
ACUTE UPPER RESPIRATORY INFECTION, UNSPE                     

 

 2018            BERNOT, ANSUH            Ot            R05            
COUGH                     

 

 2018            BERNOT, ANSHU            Ot            Z79.52          
  LONG TERM (CURRENT) USE OF SYSTEMIC STER                     

 

 2018            BERNOT, ANSHU            Ot            F17.210         
   NICOTINE DEPENDENCE, CIGARETTES, UNCOMPL                     

 

 2018            OFE ANSHU            Ot            F32.9            
MAJOR DEPRESSIVE DISORDER, SINGLE EPISOD                     

 

 2018            BERNOT, ANSHU            Ot            F41.9            
ANXIETY DISORDER, UNSPECIFIED                     

 

 2018            BERNOT, ANSHU            Ot            F43.10          
  POST-TRAUMATIC STRESS DISORDER, UNSPECIF                     

 

 2018            BERNOT ANSHU            Ot            J06.9            
ACUTE UPPER RESPIRATORY INFECTION, UNSPE                     

 

 2018            BERNOT, ANSHU            Ot            R05            
COUGH                     

 

 2018            BERNOT, ANSHU            Ot            Z79.52          
  LONG TERM (CURRENT) USE OF SYSTEMIC STER                     

 

 11/15/2018            KAN SOLORIO, ANUEL HO            Ot            
F17.210            NICOTINE DEPENDENCE, CIGARETTES, UNCOMPL                     

 

 11/15/2018            ANUEL OMER MD            Ot            F32.9 
           MAJOR DEPRESSIVE DISORDER, SINGLE EPISOD                     

 

 11/15/2018            ANUEL OMER MD            Ot            F41.9 
           ANXIETY DISORDER, UNSPECIFIED                     

 

 11/15/2018            ANUEL OMER MD            Ot            F43.10
            POST-TRAUMATIC STRESS DISORDER, UNSPECIF                     

 

 11/15/2018            ANUEL OMER MD            Ot            N94.6 
           DYSMENORRHEA, UNSPECIFIED                     

 

 11/15/2018            ANUEL OMER MD            Ot            R10.2 
           PELVIC AND PERINEAL PAIN                     

 

 11/15/2018            ANUEL OMER MD            Ot            Z79.52
            LONG TERM (CURRENT) USE OF SYSTEMIC STER                     

 

 2018            MASSIEL BECKRE MD            Ot            F17.210       
     NICOTINE DEPENDENCE, CIGARETTES, UNCOMPL                     

 

 2018            MASSIEL BECKER MD            Ot            F32.9         
   MAJOR DEPRESSIVE DISORDER, SINGLE EPISOD                     

 

 2018            MASSIEL BECKER MD            Ot            F43.10        
    POST-TRAUMATIC STRESS DISORDER, UNSPECIF                     

 

 2018            MASSIEL BECKER MD            Ot            K80.12        
    CALCULUS OF GB W ACUTE AND CHRONIC FAM                     

 

 2018            MASSIEL BECKER MD            Ot            Z11.2         
   ENCOUNTER FOR SCREENING FOR OTHER BACTER                     

 

 2018            MASSIEL BECKER MD            Ot            Z79.52        
    LONG TERM (CURRENT) USE OF SYSTEMIC STER                     

 

 2019            MASSIEL BECKER MD            Ot            F17.210       
     NICOTINE DEPENDENCE, CIGARETTES, UNCOMPL                     

 

 2019            MASSIEL BECKER MD            Ot            F32.9         
   MAJOR DEPRESSIVE DISORDER, SINGLE EPISOD                     

 

 2019            MASSIEL BECKER MD            Ot            F43.10        
    POST-TRAUMATIC STRESS DISORDER, UNSPECIF                     

 

 2019            MASSIEL BECKER MD            Ot            K80.12        
    CALCULUS OF GB W ACUTE AND CHRONIC FAM                     

 

 2019            MASSIEL BECKER MD            Ot            Z11.2         
   ENCOUNTER FOR SCREENING FOR OTHER BACTER                     

 

 2019            MASSIEL BECKER MD            Ot            Z79.52        
    LONG TERM (CURRENT) USE OF SYSTEMIC STER                     

 

 2019            MASSIEL BECKER MD            Ot            F17.210       
     NICOTINE DEPENDENCE, CIGARETTES, UNCOMPL                     

 

 2019            MASSIEL BECKER MD            Ot            F32.9         
   MAJOR DEPRESSIVE DISORDER, SINGLE EPISOD                     

 

 2019            MASSIEL BECKER MD, Ot            F43.10        
    POST-TRAUMATIC STRESS DISORDER, UNSPECIF                     

 

 2019            MASSIEL BECKER MD, Ot            K80.12        
    CALCULUS OF GB W ACUTE AND CHRONIC FAM                     

 

 2019            MASSIEL BECKER MD, Ot            Z11.2         
   ENCOUNTER FOR SCREENING FOR OTHER BACTER                     

 

 2019            MASSIEL BECKER MD, Ot            Z79.52        
    LONG TERM (CURRENT) USE OF SYSTEMIC STER                     



                                                                               
                                                                               
                        



Procedures

      





 Code            Description            Performed By            Performed On   
     

 

             44587                                  PREGNANCY TEST, URINE (IN-
HOUSE)                                   2014        

 

             14554                                  STREP A  (IN-HOUSE)        
                           2014        

 

             59691                                  PREGNANCY TEST, URINE (IN-
HOUSE)                                   2014        



                      



Results

      





 Test            Result            Range        









 Complete urinalysis with reflex to culture - 06/10/18 16:49         









 Urine color determination            YELLOW             NRG        

 

 Urine clarity determination            VERY CLOUDY             NRG        

 

 Urine pH measurement by test strip            7             5-9        

 

 Specific gravity of urine by test strip            1.010             1.016-
1.022        

 

 Urine protein assay by test strip, semi-quantitative            NEGATIVE      
       NEGATIVE        

 

 Urine glucose detection by automated test strip            NEGATIVE           
  NEGATIVE        

 

 Erythrocytes detection in urine sediment by light microscopy            3+    
         NEGATIVE        

 

 Urine ketones detection by automated test strip            NEGATIVE           
  NEGATIVE        

 

 Urine nitrite detection by test strip            NEGATIVE             NEGATIVE
        

 

 Urine total bilirubin detection by test strip            NEGATIVE             
NEGATIVE        

 

 Urine urobilinogen measurement by automated test strip (mass/volume)          
  NORMAL             NORMAL        

 

 Urine leukocyte esterase detection by dipstick            3+             
NEGATIVE        

 

 Automated urine sediment erythrocyte count by microscopy (number/high power 
field)            RARE             NRG        

 

 Automated urine sediment leukocyte count by microscopy (number/high power field
)             [HPF]            NRG        

 

 Bacteria detection in urine sediment by light microscopy            NEGATIVE  
           NRG        

 

 Squamous epithelial cells detection in urine sediment by light microscopy     
       10-25             NRG        

 

 Crystals detection in urine sediment by light microscopy            PRESENT   
          NRG        

 

 Casts detection in urine sediment by light microscopy            NONE         
    NRG        

 

 Mucus detection in urine sediment by light microscopy            NEGATIVE     
        NRG        

 

 Complete urinalysis with reflex to culture            NO             NRG      
  

 

 Amorphous sediment detection in urine sediment by light microscopy            
LARGE EMILIANA URATES             NRG        

 

 Renal epithelial cells detection in urine sediment by light microscopy        
    NONE             NRG        









 Complete urinalysis with reflex to culture - 18 03:30         









 Urine color determination            BIJU             NRG        

 

 Urine clarity determination            SLIGHTLY CLOUDY             NRG        

 

 Urine pH measurement by test strip            5             5-9        

 

 Specific gravity of urine by test strip            1.030             1.016-
1.022        

 

 Urine protein assay by test strip, semi-quantitative            2+             
NEGATIVE        

 

 Urine glucose detection by automated test strip            NEGATIVE           
  NEGATIVE        

 

 Erythrocytes detection in urine sediment by light microscopy            5+    
         NEGATIVE        

 

 Urine ketones detection by automated test strip            1+             
NEGATIVE        

 

 Urine nitrite detection by test strip            NEGATIVE             NEGATIVE
        

 

 Urine total bilirubin detection by test strip            NEGATIVE             
NEGATIVE        

 

 Urine urobilinogen measurement by automated test strip (mass/volume)          
  1 mg/dL            NORMAL        

 

 Urine leukocyte esterase detection by dipstick            2+             
NEGATIVE        

 

 Automated urine sediment erythrocyte count by microscopy (number/high power 
field)            > [HPF]            NRG        

 

 Automated urine sediment leukocyte count by microscopy (number/high power field
)             [HPF]            NRG        

 

 Bacteria detection in urine sediment by light microscopy            MODERATE  
           NRG        

 

 Squamous epithelial cells detection in urine sediment by light microscopy     
       2-5             NRG        

 

 Crystals detection in urine sediment by light microscopy            NONE      
       NRG        

 

 Casts detection in urine sediment by light microscopy            NONE         
    NRG        

 

 Mucus detection in urine sediment by light microscopy            NEGATIVE     
        NRG        

 

 Complete urinalysis with reflex to culture            YES             NRG     
   









 Bacterial urine culture - 18 03:30         









 Bacterial urine culture            SEE COMMEN             NRG        

 

 COLONY COUNT            .             NRG        









 Complete urinalysis with reflex to culture - 18 23:20         









 Urine color determination            YELLOW             NRG        

 

 Urine clarity determination            SLIGHTLY CLOUDY             NRG        

 

 Urine pH measurement by test strip            7             5-9        

 

 Specific gravity of urine by test strip            1.010             1.016-
1.022        

 

 Urine protein assay by test strip, semi-quantitative            NEGATIVE      
       NEGATIVE        

 

 Urine glucose detection by automated test strip            NEGATIVE           
  NEGATIVE        

 

 Erythrocytes detection in urine sediment by light microscopy            5+    
         NEGATIVE        

 

 Urine ketones detection by automated test strip            NEGATIVE           
  NEGATIVE        

 

 Urine nitrite detection by test strip            NEGATIVE             NEGATIVE
        

 

 Urine total bilirubin detection by test strip            NEGATIVE             
NEGATIVE        

 

 Urine urobilinogen measurement by automated test strip (mass/volume)          
  NORMAL             NORMAL        

 

 Urine leukocyte esterase detection by dipstick            1+             
NEGATIVE        

 

 Automated urine sediment erythrocyte count by microscopy (number/high power 
field)            > [HPF]            NRG        

 

 Automated urine sediment leukocyte count by microscopy (number/high power field
)             [HPF]            NRG        

 

 Bacteria detection in urine sediment by light microscopy            TRACE     
        NRG        

 

 Squamous epithelial cells detection in urine sediment by light microscopy     
       2-5             NRG        

 

 Crystals detection in urine sediment by light microscopy            NONE      
       NRG        

 

 Casts detection in urine sediment by light microscopy            NONE         
    NRG        

 

 Mucus detection in urine sediment by light microscopy            SMALL        
     NRG        

 

 Complete urinalysis with reflex to culture            NO             NRG      
  









 Complete urinalysis with reflex to culture - 18 05:45         









 Urine color determination            YELLOW             NRG        

 

 Urine clarity determination            VERY CLOUDY             NRG        

 

 Urine pH measurement by test strip            7             5-9        

 

 Specific gravity of urine by test strip            1.010             1.016-
1.022        

 

 Urine protein assay by test strip, semi-quantitative            NEGATIVE      
       NEGATIVE        

 

 Urine glucose detection by automated test strip            NEGATIVE           
  NEGATIVE        

 

 Erythrocytes detection in urine sediment by light microscopy            
NEGATIVE             NEGATIVE        

 

 Urine ketones detection by automated test strip            NEGATIVE           
  NEGATIVE        

 

 Urine nitrite detection by test strip            NEGATIVE             NEGATIVE
        

 

 Urine total bilirubin detection by test strip            NEGATIVE             
NEGATIVE        

 

 Urine urobilinogen measurement by automated test strip (mass/volume)          
  NORMAL             NORMAL        

 

 Urine leukocyte esterase detection by dipstick            1+             
NEGATIVE        

 

 Automated urine sediment erythrocyte count by microscopy (number/high power 
field)            NONE             NRG        

 

 Automated urine sediment leukocyte count by microscopy (number/high power field
)            RARE             NRG        

 

 Bacteria detection in urine sediment by light microscopy            TRACE     
        NRG        

 

 Squamous epithelial cells detection in urine sediment by light microscopy     
       RARE             NRG        

 

 Crystals detection in urine sediment by light microscopy            PRESENT   
          NRG        

 

 Casts detection in urine sediment by light microscopy            NONE         
    NRG        

 

 Mucus detection in urine sediment by light microscopy            NEGATIVE     
        NRG        

 

 Complete urinalysis with reflex to culture            NO             NRG      
  

 

 Amorphous sediment detection in urine sediment by light microscopy            
LARGE EMILIANA URATES             NRG        









 Complete blood count (CBC) with automated white blood cell (WBC) differential 
- 18 06:00         









 Blood leukocytes automated count (number/volume)            10.7 10*3/uL      
      4.3-11.0        

 

 Blood erythrocytes automated count (number/volume)            4.81 10*6/uL    
        4.35-5.85        

 

 Venous blood hemoglobin measurement (mass/volume)            13.7 g/dL        
    11.5-16.0        

 

 Blood hematocrit (volume fraction)            40 %            35-52        

 

 Automated erythrocyte mean corpuscular volume            84 [foz_us]          
  80-99        

 

 Automated erythrocyte mean corpuscular hemoglobin (mass per erythrocyte)      
      29 pg            25-34        

 

 Automated erythrocyte mean corpuscular hemoglobin concentration measurement (
mass/volume)            34 g/dL            32-36        

 

 Automated erythrocyte distribution width ratio            12.5 %            
10.0-14.5        

 

 Automated blood platelet count (count/volume)            324 10*3/uL          
  130-400        

 

 Automated blood platelet mean volume measurement            10.4 [foz_us]     
       7.4-10.4        

 

 Automated blood neutrophils/100 leukocytes            60 %            42-75   
     

 

 Automated blood lymphocytes/100 leukocytes            29 %            12-44   
     

 

 Blood monocytes/100 leukocytes            9 %            0-12        

 

 Automated blood eosinophils/100 leukocytes            2 %            0-10     
   

 

 Automated blood basophils/100 leukocytes            0 %            0-10        

 

 Blood neutrophils automated count (number/volume)            6.4 10*3         
   1.8-7.8        

 

 Blood lymphocytes automated count (number/volume)            3.1 10*3         
   1.0-4.0        

 

 Blood monocytes automated count (number/volume)            0.9 10*3            
0.0-1.0        

 

 Automated eosinophil count            0.2 10*3/uL            0.0-0.3        

 

 Automated blood basophil count (count/volume)            0.0 10*3/uL          
  0.0-0.1        









 Serum or plasma choriogonadotropin (pregnancy test) detection - 18 06:00
         









 Serum or plasma choriogonadotropin (pregnancy test) detection            
NEGATIVE             NEGATIVE        









 Comprehensive metabolic panel - 18 06:00         









 Serum or plasma sodium measurement (moles/volume)            142 mmol/L       
     135-145        

 

 Serum or plasma potassium measurement (moles/volume)            4.0 mmol/L    
        3.6-5.0        

 

 Serum or plasma chloride measurement (moles/volume)            108 mmol/L     
               

 

 Carbon dioxide            22 mmol/L            21-32        

 

 Serum or plasma anion gap determination (moles/volume)            12 mmol/L   
         5-14        

 

 Serum or plasma urea nitrogen measurement (mass/volume)            12 mg/dL   
         7-18        

 

 Serum or plasma creatinine measurement (mass/volume)            0.82 mg/dL    
        0.60-1.30        

 

 Serum or plasma urea nitrogen/creatinine mass ratio            15             
NRG        

 

 Serum or plasma creatinine measurement with calculation of estimated 
glomerular filtration rate            >             NRG        

 

 Serum or plasma glucose measurement (mass/volume)            116 mg/dL        
            

 

 Serum or plasma calcium measurement (mass/volume)            9.8 mg/dL        
    8.5-10.1        

 

 Serum or plasma total bilirubin measurement (mass/volume)            0.3 mg/dL
            0.1-1.0        

 

 Serum or plasma alkaline phosphatase measurement (enzymatic activity/volume)  
          94 U/L                    

 

 Serum or plasma aspartate aminotransferase measurement (enzymatic activity/
volume)            15 U/L            5-34        

 

 Serum or plasma alanine aminotransferase measurement (enzymatic activity/volume
)            14 U/L            0-55        

 

 Serum or plasma protein measurement (mass/volume)            7.2 g/dL         
   6.4-8.2        

 

 Serum or plasma albumin measurement (mass/volume)            4.5 g/dL         
   3.2-4.5        

 

 CALCIUM CORRECTED            9.4 mg/dL            8.5-10.1        









 Lipase - 18 06:00         









 Lipase            26 U/L            8-78        









 Methicillin resistant Staphylococcus aureus (MRSA) screening culture -  11:30         









 Methicillin resistant Staphylococcus aureus (MRSA) screening culture          
  NEG             NRG        



                                  



Encounters

      





 ACCT No.            Visit Date/Time            Discharge            Status    
        Pt. Type            Provider            Facility            Loc./Unit  
          Complaint        

 

 [id number]            2014 14:24:00            2014 23:59:59          
  CLS            Outpatient            ANJU SPRAGUE                  
                             

 

 [id number]            2014 13:39:00            2014 23:59:59          
  CLS            Outpatient            JOSE ELIAS LAWTON           
                                    

 

 [id number]            2018 08:40:00            2018 17:40:00    
        DIS            Outpatient            MASSIEL BECKER MD            [address] 
[facility]            CHOLELITRIASIS/
CHOLECYSTITIS        

 

 [id number]            2018 22:41:00            2018 00:11:00    
        DIS            Outpatient            KAN SOLORIO, ANUEL HO          
  [facility]            ER            PAIN IN UTERUS 
SHOOTING UP LOWER BACK        

 

 [id number]            2018 10:06:00            2018 12:12:00    
        DIS            Emergency            BERNANSHU LEÓN            [address] 
[facility]            ER            COUGH/SOA        

 

 [id number]            2018 03:18:00            2018 04:10:00    
        DIS            Emergency            ANUEL OMER MD            
[facility]            ER            PELVIC PAIN        

 

 [id number]            06/10/2018 16:12:00            06/10/2018 17:40:00    
        DIS            Emergency            RADHATABBY ARNP            [facility] 
[facility]            ER            RECTAL BLEEDING        

 

 [id number]            2013 15:20:00            2013 23:59:59    
        CLS            Outpatient                                              
              

 

 [id number]            2019 15:15:00                         ACT       
     Emergency            DMITRY SOLORIO, DONOVAN ANDERSON            [facility]            ER            UPPER RESPIRATORY PROBLEMS        

 

 [id number]            01/10/2019 13:40:00            01/10/2019 23:59:59            
CLS            Outpatient            NIDIA WILD LAC                         
[facility]                     

 

 [id number]            2018 18:18:00                         [facility]            
Unknown

## 2019-01-18 NOTE — ED RESPIRATORY
General


Stated Complaint:  UPPER RESPIRATORY PROBLEMS


Source:  patient, family


Exam Limitations:  no limitations





History of Present Illness


Date Seen by Provider:  Jan 18, 2019


Time Seen by Provider:  15:31


Initial Comments


This 23-year-old white female presents with a complaint of persistent cough 

despite over-the-counter medications.  The patient has been seen recently in 

[facility].





Patient denies productive cough, fever, chills, headache, stiff neck, or 

photophobia.  Similarly the patient denies associated nausea or vomiting.  She'

s had no associated diarrhea or dysuria.





Allergies and Home Medications


Allergies


Coded Allergies:  


     No Known Drug Allergies (Unverified , 8/16/09)





Home Medications


Azithromycin 250 Mg Tablet, 250 MG PO UD


   TAKE 2 TABLETS TODAY, THEN TAKE 1 TABLET DAILY FOR 4 MORE DAYS 


   Prescribed by: ANSHU THAKUR on 9/18/18 1120


Hydrocodone Bit/Acetaminophen 1 Ea Tablet, 1 EACH PO Q4H PRN for PAIN-MODERATE


   Prescribed by: MASSIEL BECKER on 12/31/18 1058


Hyoscyamine Sulfate 0.125 Mg Tab.subl, 0.125 MG SL Q4H PRN for CRAMPS


   Prescribed by: ANUEL BARTON on 11/14/18 0004


Prednisone 10 Mg Tab.ds.pk, 10 MG PO UD


   Prescribed by: ANSHU THAKUR on 9/18/18 1120





Patient Home Medication List


Home Medication List Reviewed:  Yes





Review of Systems


Review of Systems


Constitutional:  No chills, No fever


EENTM:  No ear pain, No vision loss


Respiratory:  see HPI, cough


Cardiovascular:  No chest pain


Gastrointestinal:  No abdominal pain, No diarrhea, No nausea, No vomiting


Genitourinary:  no symptoms reported


Musculoskeletal:  No back pain


Skin:  No rash


Psychiatric/Neurological:  No Symptoms Reported


Hematologic/Lymphatic:  No Symptoms Reported


Immunological/Allergic:  no symptoms reported





Past Medical-Social-Family Hx


Past Med/Social Hx:  Reviewed Nursing Past Med/Soc Hx


Patient Social History


Drug of Choice:  MARIJUANA-LATELY HAS BEEN DAILY USE


Type Used:  Cigarettes


2nd Hand Smoke Exposure:  No


Recent Hopitalizations:  No





Immunizations Up To Date


Tetanus Booster (TDap):  Unknown





Seasonal Allergies


Seasonal Allergies:  No





Past Medical History


Surgeries:  No


Respiratory:  No


Cardiac:  No


Neurological:  No


Female Reproductive Disorders:  Menstrual Problems


Genitourinary:  No


Gastrointestinal:  Yes (RECENT ONSET RUQ ABD PAIN)


Musculoskeletal:  No


Endocrine:  No


HEENT:  No


Cancer:  No


Psychosocial:  Yes


Sleep Difficulties, Anxiety, PTSD, Depression


Integumentary:  No


Blood Disorders:  No


Adverse Reaction/Blood Tranf:  No





Physical Exam





Vital Signs - First Documented








 1/18/19





 15:56


 


Pulse Ox 95


 


O2 Delivery Room Air





Capillary Refill :


Height: 5'8.00"


Weight: 239lbs. 0.0oz. [id number]; 36.3 BMI


Method:Stated


General Appearance:  WD/WN, no apparent distress


Eyes:  Bilateral Eye Normal Inspection


HEENT:  normal ENT inspection


Neck:  non-tender, full range of motion


Respiratory:  chest non-tender, lungs clear, normal breath sounds, no 

respiratory distress, no accessory muscle use


Cardiovascular:  normal peripheral pulses, regular rate, rhythm


Gastrointestinal:  normal bowel sounds, non tender, soft


Extremities:  normal range of motion, non-tender, normal inspection


Neurologic/Psychiatric:  no motor/sensory deficits, alert, oriented x 3


Skin:  normal color, warm/dry; No rash





Progress/Results/Core Measures


Suspected Sepsis


SIRS


Temperature: 


Pulse:  


Respiratory Rate: 


 


Laboratory Tests


1/18/19 15:44: White Blood Count 11.0


Blood Pressure  / 


Mean: 


 











Laboratory Tests


1/18/19 15:44: Platelet Count 385





Results/Orders


Lab Results





Laboratory Tests








Test


 1/18/19


15:44 Range/Units


 


 


White Blood Count


 11.0 


 4.3-11.0


10^3/uL


 


Red Blood Count


 4.63 


 4.35-5.85


10^6/uL


 


Hemoglobin 13.3  11.5-16.0  G/DL


 


Hematocrit 38  35-52  %


 


Mean Corpuscular Volume 83  80-99  FL


 


Mean Corpuscular Hemoglobin 29  25-34  PG


 


Mean Corpuscular Hemoglobin


Concent 35 


 32-36  G/DL





 


Red Cell Distribution Width 12.2  10.0-14.5  %


 


Platelet Count


 385 


 130-400


10^3/uL


 


Mean Platelet Volume 10.1  7.4-10.4  FL


 


Neutrophils (%) (Auto) 68  42-75  %


 


Lymphocytes (%) (Auto) 24  12-44  %


 


Monocytes (%) (Auto) 7  0-12  %


 


Eosinophils (%) (Auto) 2  0-10  %


 


Basophils (%) (Auto) 0  0-10  %


 


Neutrophils # (Auto) 7.5  1.8-7.8  X 10^3


 


Lymphocytes # (Auto) 2.6  1.0-4.0  X 10^3


 


Monocytes # (Auto) 0.8  0.0-1.0  X 10^3


 


Eosinophils # (Auto)


 0.2 


 0.0-0.3


10^3/uL


 


Basophils # (Auto)


 0.0 


 0.0-0.1


10^3/uL








My Orders





Orders - DONOVAN ANDRES MD


Chest Pa/Lat (2 View) (1/18/19 15:30)


Cbc With Automated Diff (1/18/19 15:30)


Albuterol/Ipra Inhalation Soln (Duoneb I (1/18/19 15:30)


Svn Small Volume Nebulizer (1/18/19 15:30)


Urine Pregnancy Bedside (1/18/19 16:11)





Medications Given in ED





Current Medications








 Medications  Dose


 Ordered  Sig/Nicola


 Route  Start Time


 Stop Time Status Last Admin


Dose Admin


 


 Albuterol/


 Ipratropium  3 ml  ONCE  ONCE


 INH  1/18/19 15:30


 1/18/19 15:31 DC 1/18/19 15:56


3 ML








Vital Signs/I&O











 1/18/19





 15:56


 


Pulse Ox 95


 


O2 Delivery Room Air





Capillary Refill :


Progress Note :  


   Time:  17:02


Progress Note


The patient's CBC and chest x-ray were unremarkable.





I discussed the presentation and findings with the family.  They would very 

much like to employ an antibiotic and states that Zithromax has worked well for 

him in the past





I prescribed a Z-Joshua for the patient.  I recommended close follow-up Dr. Lucia on Monday.  I that they return if any further problems or questions.





Departure


Impression





 Primary Impression:  


 Bronchitis


Disposition:  01 HOME, SELF-CARE


Condition:  Improved





Departure-Patient Inst.


Decision time for Depature:  17:03


Referrals:  


GEOFFREY LUCIA DO (PCP/Family)


Primary Care Physician


Patient Instructions:  Acute Bronchitis, Adult (DC)





Add. Discharge Instructions:  


Z-Joshua as prescribed.  Use your inhaler at home as needed.  Follow-up Dr. Lucia Monday.  Return if any problems or questions.











DONOVAN ANDRES MD Jan 18, 2019 15:36

## 2019-02-21 ENCOUNTER — HOSPITAL ENCOUNTER (EMERGENCY)
Dept: HOSPITAL 75 - ER | Age: 24
Discharge: HOME | End: 2019-02-21
Payer: MEDICAID

## 2019-02-21 VITALS — WEIGHT: 240 LBS | HEIGHT: 67 IN | BODY MASS INDEX: 37.67 KG/M2

## 2019-02-21 VITALS — DIASTOLIC BLOOD PRESSURE: 69 MMHG | SYSTOLIC BLOOD PRESSURE: 102 MMHG

## 2019-02-21 DIAGNOSIS — F12.10: ICD-10-CM

## 2019-02-21 DIAGNOSIS — F17.210: ICD-10-CM

## 2019-02-21 DIAGNOSIS — F43.10: ICD-10-CM

## 2019-02-21 DIAGNOSIS — F32.9: ICD-10-CM

## 2019-02-21 DIAGNOSIS — B34.9: Primary | ICD-10-CM

## 2019-02-21 DIAGNOSIS — F41.9: ICD-10-CM

## 2019-02-21 PROCEDURE — 96372 THER/PROPH/DIAG INJ SC/IM: CPT

## 2019-02-21 PROCEDURE — 87430 STREP A AG IA: CPT

## 2019-02-21 PROCEDURE — 87804 INFLUENZA ASSAY W/OPTIC: CPT

## 2019-02-21 NOTE — ED COUGH/URI
General


Chief Complaint:  Cough/Cold/Flu Symptoms


Stated Complaint:  THROAT PAIN;COUGH


Nursing Triage Note:  


ARRIVED VIA AMB TO TRIAGE.  COMPLAINS OF COUGH AND SORE THROAT.  ALSO WORRIED 


ABOUT HER DRY NOSE AND OCCASIONAL NOSE BLEED.  STATES OTHER MEMBERS OF HER 


FAMILY HAVE BEEN DX WITH A URI.


Sepsis Screen:  No Definite Risk


Source:  patient


Exam Limitations:  no limitations





History of Present Illness


Date Seen by Provider:  Feb 21, 2019


Time Seen by Provider:  11:40


Initial Comments


To ER with a three-day history of cough and sore throat as well as nasal 

congestion. No fevers.


Timing/Duration:  constant


Severity/Quality:  productive cough


Prior Episodes/Possible Cause:  no prior episodes


Associated Symptoms:  nasal congestion, nasal drainage, sore throat





Allergies and Home Medications


Allergies


Coded Allergies:  


     No Known Drug Allergies (Unverified , 8/16/09)





Patient Home Medication List


Home Medication List Reviewed:  Yes





Review of Systems


Review of Systems


Constitutional:  see HPI


EENTM:  see HPI


Respiratory:  see HPI, cough


Cardiovascular:  no symptoms reported


Genitourinary:  no symptoms reported


Musculoskeletal:  no symptoms reported


Skin:  no symptoms reported


Psychiatric/Neurological:  No Symptoms Reported


Hematologic/Lymphatic:  No Symptoms Reported


Immunological/Allergic:  no symptoms reported





Past Medical-Social-Family Hx


Patient Social History


Alcohol Use:  Occasionally Uses


Recreational Drug Use:  Yes


Drug of Choice:  POT


Smoking Status:  Current Everyday Smoker


Type Used:  Cigarettes


2nd Hand Smoke Exposure:  No


Recent Foreign Travel:  No


Contact w/Someone Who Travel:  No


Recent Infectious Disease Expo:  No


Recent Hopitalizations:  No





Immunizations Up To Date


Tetanus Booster (TDap):  Unknown





Seasonal Allergies


Seasonal Allergies:  No





Past Medical History


Surgeries:  No


Respiratory:  No


Cardiac:  No


Neurological:  No


Female Reproductive Disorders:  Menstrual Problems


Genitourinary:  No


Gastrointestinal:  Yes (RECENT ONSET RUQ ABD PAIN)


Musculoskeletal:  No


Endocrine:  No


HEENT:  No


Cancer:  No


Psychosocial:  Yes


Sleep Difficulties, Anxiety, PTSD, Depression


Integumentary:  No


Blood Disorders:  No


Adverse Reaction/Blood Tranf:  No





Physical Exam





Vital Signs - First Documented








 2/21/19





 11:18


 


Temp 98.6


 


Pulse 90


 


Resp 16


 


B/P (MAP) 102/69 (80)


 


Pulse Ox 96


 


O2 Delivery Room Air





Capillary Refill : Less Than 3 Seconds


Height: 5'7.00"


Weight: 240lbs. 0.0oz. 108.995862bj; 36.3 BMI


Method:Stated


General Appearance:  WD/WN, no apparent distress


Eyes:  Bilateral Eye Normal Inspection


HEENT:  PERRL/EOMI, normal ENT inspection


Respiratory:  no respiratory distress, no accessory muscle use


Cardiovascular:  regular rate, rhythm, no murmur


Gastrointestinal:  normal bowel sounds, non tender, soft


Neurologic/Psychiatric:  alert, normal mood/affect, oriented x 3


Skin:  normal color, warm/dry





Progress/Results/Core Measures


Suspected Sepsis


Recent Fever Within 48 Hours:  No


Infection Criteria Present:  Suspected New Infection


New/Unexplained  Altered Menta:  No


Sepsis Screen:  No Definite Risk


SIRS


Temperature:98.6 


Pulse: 90 


Respiratory Rate: 16


 


Blood Pressure 102 /69 


Mean: 80





Results/Orders


Lab Results





Laboratory Tests








Test


 2/21/19


11:25 Range/Units


 








My Orders





Orders - CAROLIN BRO


Influenza A And B Antigens (2/21/19 11:19)


Rapid Strep A Screen (2/21/19 11:19)





Vital Signs/I&O











 2/21/19





 11:18


 


Temp 98.6


 


Pulse 90


 


Resp 16


 


B/P (MAP) 102/69 (80)


 


Pulse Ox 96


 


O2 Delivery Room Air





Capillary Refill : Less Than 3 Seconds








Blood Pressure Mean:  80











Departure


Impression





 Primary Impression:  


 Viral syndrome


Disposition:  01 HOME, SELF-CARE


Condition:  Stable





Departure-Patient Inst.


Decision time for Depature:  11:43


Referrals:  


GEOFFREY LUCIA DO (PCP/Family)


Primary Care Physician


Patient Instructions:  Cough, Runny Nose, and the Common Cold (DC)





Add. Discharge Instructions:  


1. Tylenol and Motrin


2. Plenty of fluids


3. Follow-up with your doctor next week


All discharge instructions reviewed with patient and/or family. Voiced 

understanding.


Scripts


D-Methorphan Hb/P-Epd HCl/Bpm (Bromfed Dm Cough Syrup) 118 Ml Syrup


5 ML PO Q4H PRN for CONGESTION, #120 ML


   Prov: CAROLIN BRO         2/21/19


Work/School Note:  Work Release Form   Date Seen in the Emergency Department:  

Feb 21, 2019


   Return to Work:  Feb 23, 2019











CAROLIN BRO Feb 21, 2019 11:44

## 2019-02-21 NOTE — XMS REPORT
Continuity of Care Document

 Created on: 2019



MARIE BARNETT

External Reference #: [id number]

: 1995

Sex: Female



Demographics







 Address  [address], [place]  [address]

 

 Home Phone  [phone] x

 

 Preferred Language  Unknown

 

 Marital Status  Unknown

 

 Faith Affiliation  Unknown

 

 Race  Unknown

 

 Ethnic Group  Unknown





Author







 Author  [facility]

 

 Address  Unknown

 

 Phone  Unavailable



              



Allergies

      





 Active            Description            Code            Type            
Severity            Reaction            Onset            Reported/Identified   
         Relationship to Patient            Clinical Status        

 

 Yes            No Known Drug Allergies            [id number]            Drug 
Allergy            Mild            N/A                         2009      
                            



                  



Medications

      



There is no data.                  



Problems

      





 Date Dx Coded            Attending            Type            Code            
Diagnosis            Diagnosed By        

 

 2014            JOSE ELIAS LAWTON                         
477.9            ALLERGIC RHINITIS CAUSE UNSPECIFIED                     

 

 2014            JOSE ELIAS LAWTON N                         
478.19            OTHER DISEASES OF NASAL CAVITY AND SINUSES                   
  

 

 2014            ANJU SPRAGUE A                         477.9    
        ALLERGIC RHINITIS CAUSE UNSPECIFIED                     

 

 2014            ANJU SPRAGUE A                         478.19   
         OTHER DISEASES OF NASAL CAVITY AND SINUSES                     

 

 2014            ANJU SPRAGUE A                         V25.01   
         CONTRACEPTION - ORAL CONTRACEPTION                     

 

 2014            ANJU SPRAGUE A                         V65.45   
         STD COUNSELING                     

 

 2014            ANJU SPRAGUE A                         V72.41   
         PREGNANCY TEST NEGATIVE RESULT                     

 

 06/10/2018            TABBY GARCIAP            Ot            F17.210         
   NICOTINE DEPENDENCE, CIGARETTES, UNCOMPL                     

 

 06/10/2018            TABBY GARCIAP            Ot            F32.9            
MAJOR DEPRESSIVE DISORDER, SINGLE EPISOD                     

 

 06/10/2018            TABBY GARCIA ARNP            Ot            F41.9            
ANXIETY DISORDER, UNSPECIFIED                     

 

 06/10/2018            TABBY GARCIAP            Ot            G47.9            
SLEEP DISORDER, UNSPECIFIED                     

 

 06/10/2018            TABBY GARCIA ARNP            Ot            K62.89          
  OTHER SPECIFIED DISEASES OF ANUS AND REC                     

 

 06/10/2018            TABBY GARCIAP            Ot            K64.9            
UNSPECIFIED HEMORRHOIDS                     

 

 2018            TABBY GARCIA ARNP            Ot            F17.210         
   NICOTINE DEPENDENCE, CIGARETTES, UNCOMPL                     

 

 2018            RADHA TABBY ARNP            Ot            F32.9            
MAJOR DEPRESSIVE DISORDER, SINGLE EPISOD                     

 

 2018            RADHA TABBY ARNP            Ot            F41.9            
ANXIETY DISORDER, UNSPECIFIED                     

 

 2018            RADHA, TABBY ARNP            Ot            G47.9            
SLEEP DISORDER, UNSPECIFIED                     

 

 2018            RADHA, TABBY ARNP            Ot            K62.89          
  OTHER SPECIFIED DISEASES OF ANUS AND REC                     

 

 2018            RADHA, TABBY ARNP            Ot            K64.9            
UNSPECIFIED HEMORRHOIDS                     

 

 2018            RADHA, TABBY ARNP            Ot            F17.210         
   NICOTINE DEPENDENCE, CIGARETTES, UNCOMPL                     

 

 2018            RADHA, TABBY ARNP            Ot            F32.9            
MAJOR DEPRESSIVE DISORDER, SINGLE EPISOD                     

 

 2018            RADHA, TABBY ARNP            Ot            F41.9            
ANXIETY DISORDER, UNSPECIFIED                     

 

 2018            RADHA, TABBY ARNP            Ot            G47.9            
SLEEP DISORDER, UNSPECIFIED                     

 

 2018            RADHA, TABBY ARNP            Ot            K62.89          
  OTHER SPECIFIED DISEASES OF ANUS AND REC                     

 

 2018            RADHA, TABBY ARNP            Ot            K64.9            
UNSPECIFIED HEMORRHOIDS                     

 

 2018            KAN SOLORIO, ANUEL T            Ot            
F17.210            NICOTINE DEPENDENCE, CIGARETTES, UNCOMPL                     

 

 2018            ANUEL OMER MD T            Ot            F32.9 
           MAJOR DEPRESSIVE DISORDER, SINGLE EPISOD                     

 

 2018            ANUEL OMER MD            Ot            F41.9 
           ANXIETY DISORDER, UNSPECIFIED                     

 

 2018            ANUEL OMER MD T            Ot            F43.10
            POST-TRAUMATIC STRESS DISORDER, UNSPECIF                     

 

 2018            ANUEL OMER MD T            Ot            N39.0 
           URINARY TRACT INFECTION, SITE NOT SPECIF                     

 

 2018            ANUEL OMER MD T            Ot            R10.2 
           PELVIC AND PERINEAL PAIN                     

 

 2018            ANUEL OMER MD T            Ot            
F17.210            NICOTINE DEPENDENCE, CIGARETTES, UNCOMPL                     

 

 2018            ANUEL OMER MD            Ot            F32.9 
           MAJOR DEPRESSIVE DISORDER, SINGLE EPISOD                     

 

 2018            ANUEL OMER MD T            Ot            F41.9 
           ANXIETY DISORDER, UNSPECIFIED                     

 

 2018            ANUEL OMER MD T            Ot            F43.10
            POST-TRAUMATIC STRESS DISORDER, UNSPECIF                     

 

 2018            ANUEL OMER MD T            Ot            N39.0 
           URINARY TRACT INFECTION, SITE NOT SPECIF                     

 

 2018            KAN SOLORIO, ANUEL HO            Ot            R10.2 
           PELVIC AND PERINEAL PAIN                     

 

 2018            BERNOT, ANSHU            Ot            F17.210         
   NICOTINE DEPENDENCE, CIGARETTES, UNCOMPL                     

 

 2018            BERNOT ANSHU            Ot            F32.9            
MAJOR DEPRESSIVE DISORDER, SINGLE EPISOD                     

 

 2018            BERNOT, ANSHU            Ot            F41.9            
ANXIETY DISORDER, UNSPECIFIED                     

 

 2018            BERNOT, ANSHU            Ot            F43.10          
  POST-TRAUMATIC STRESS DISORDER, UNSPECIF                     

 

 2018            BERNOT, ANSHU            Ot            J06.9            
ACUTE UPPER RESPIRATORY INFECTION, UNSPE                     

 

 2018            BERNOT, ANSHU            Ot            R05            
COUGH                     

 

 2018            BERNOT, ANSHU            Ot            Z79.52          
  LONG TERM (CURRENT) USE OF SYSTEMIC STER                     

 

 2018            JEANETHOT, ANSHU            Ot            F17.210         
   NICOTINE DEPENDENCE, CIGARETTES, UNCOMPL                     

 

 2018            OFE ANSHU            Ot            F32.9            
MAJOR DEPRESSIVE DISORDER, SINGLE EPISOD                     

 

 2018            BERNOT ANSHU            Ot            F41.9            
ANXIETY DISORDER, UNSPECIFIED                     

 

 2018            BERNOT, ANSHU            Ot            F43.10          
  POST-TRAUMATIC STRESS DISORDER, UNSPECIF                     

 

 2018            BERNOT, ANSHU            Ot            J06.9            
ACUTE UPPER RESPIRATORY INFECTION, UNSPE                     

 

 2018            BERNOT, ANSHU            Ot            R05            
COUGH                     

 

 2018            BERNOT, ANSHU            Ot            Z79.52          
  LONG TERM (CURRENT) USE OF SYSTEMIC STER                     

 

 2018            BERNOT, ANSHU            Ot            F17.210         
   NICOTINE DEPENDENCE, CIGARETTES, UNCOMPL                     

 

 2018            OFE ANSHU            Ot            F32.9            
MAJOR DEPRESSIVE DISORDER, SINGLE EPISOD                     

 

 2018            BERNOT, ANSHU            Ot            F41.9            
ANXIETY DISORDER, UNSPECIFIED                     

 

 2018            BERNOT, ANSHU            Ot            F43.10          
  POST-TRAUMATIC STRESS DISORDER, UNSPECIF                     

 

 2018            BERNOT ANSHU            Ot            J06.9            
ACUTE UPPER RESPIRATORY INFECTION, UNSPE                     

 

 2018            BERNOT, ANSHU            Ot            R05            
COUGH                     

 

 2018            BERNOT, ANSHU            Ot            Z79.52          
  LONG TERM (CURRENT) USE OF SYSTEMIC STER                     

 

 2018            KAN SOLORIO, ANUEL HO            Ot            
F17.210            NICOTINE DEPENDENCE, CIGARETTES, UNCOMPL                     

 

 2018            ANUEL OMER MD            Ot            F32.9 
           MAJOR DEPRESSIVE DISORDER, SINGLE EPISOD                     

 

 2018            ANUEL OMER MD            Ot            F41.9 
           ANXIETY DISORDER, UNSPECIFIED                     

 

 2018            ANUEL OMER MD            Ot            F43.10
            POST-TRAUMATIC STRESS DISORDER, UNSPECIF                     

 

 2018            ANUEL OMER MD T            Ot            N94.6 
           DYSMENORRHEA, UNSPECIFIED                     

 

 2018            ANUEL MOER MD T            Ot            R10.2 
           PELVIC AND PERINEAL PAIN                     

 

 2018            ANUEL OMER MD            Ot            Z79.52
            LONG TERM (CURRENT) USE OF SYSTEMIC STER                     

 

 11/15/2018            ANUEL OMER MD            Ot            
F17.210            NICOTINE DEPENDENCE, CIGARETTES, UNCOMPL                     

 

 11/15/2018            ANUEL OMER MD            Ot            F32.9 
           MAJOR DEPRESSIVE DISORDER, SINGLE EPISOD                     

 

 11/15/2018            ANUEL OMER MD            Ot            F41.9 
           ANXIETY DISORDER, UNSPECIFIED                     

 

 11/15/2018            ANUEL OMER MD            Ot            F43.10
            POST-TRAUMATIC STRESS DISORDER, UNSPECIF                     

 

 11/15/2018            ANUEL OMER MD            Ot            N94.6 
           DYSMENORRHEA, UNSPECIFIED                     

 

 11/15/2018            ANUEL OMER MD            Ot            R10.2 
           PELVIC AND PERINEAL PAIN                     

 

 11/15/2018            ANUEL OMER MD            Ot            Z79.52
            LONG TERM (CURRENT) USE OF SYSTEMIC STER                     

 

 2018            MASSIEL BECKER MD            Ot            F17.210       
     NICOTINE DEPENDENCE, CIGARETTES, UNCOMPL                     

 

 2018            MASSIEL BECKER MD            Ot            F32.9         
   MAJOR DEPRESSIVE DISORDER, SINGLE EPISOD                     

 

 2018            MASSIEL BECKER MD            Ot            F43.10        
    POST-TRAUMATIC STRESS DISORDER, UNSPECIF                     

 

 2018            MASSIEL BECKER MD            Ot            K80.12        
    CALCULUS OF GB W ACUTE AND CHRONIC FAM                     

 

 2018            MASSIEL BECKER MD            Ot            Z11.2         
   ENCOUNTER FOR SCREENING FOR OTHER BACTER                     

 

 2018            MASSIEL BECKER MD, Ot            Z79.52        
    LONG TERM (CURRENT) USE OF SYSTEMIC STER                     

 

 2019            MASSIEL BECKER MD            Ot            F17.210       
     NICOTINE DEPENDENCE, CIGARETTES, UNCOMPL                     

 

 2019            MASSIEL BECKER MD            Ot            F32.9         
   MAJOR DEPRESSIVE DISORDER, SINGLE EPISOD                     

 

 2019            MASSIEL BECKER MD            Ot            F43.10        
    POST-TRAUMATIC STRESS DISORDER, UNSPECIF                     

 

 2019            MASSIEL BECKER MD            Ot            K80.12        
    CALCULUS OF GB W ACUTE AND CHRONIC FAM                     

 

 2019            MASSIEL BECKER MD            Ot            Z11.2         
   ENCOUNTER FOR SCREENING FOR OTHER BACTER                     

 

 2019            MASSIEL BECKER MD            Ot            Z79.52        
    LONG TERM (CURRENT) USE OF SYSTEMIC STER                     

 

 2019            MASSIEL BECKER MD            Ot            F17.210       
     NICOTINE DEPENDENCE, CIGARETTES, UNCOMPL                     

 

 2019            MASSIEL BECKER MD            Ot            F32.9         
   MAJOR DEPRESSIVE DISORDER, SINGLE EPISOD                     

 

 2019            MASSIEL BECKER MD            Ot            F43.10        
    POST-TRAUMATIC STRESS DISORDER, UNSPECIF                     

 

 2019            MASSIEL BECKER MD            Ot            K80.12        
    CALCULUS OF GB W ACUTE AND CHRONIC FAM                     

 

 2019            MASSIEL BECKER MD            Ot            Z11.2         
   ENCOUNTER FOR SCREENING FOR OTHER BACTER                     

 

 2019            MASSIEL BECKER MD            Ot            Z79.52        
    LONG TERM (CURRENT) USE OF SYSTEMIC STER                     

 

 2019            DONOVAN ANDRES MD            Ot            F12.10      
      CANNABIS ABUSE, UNCOMPLICATED                     

 

 2019            DONOVAN ANDRES MD            Ot            F32.9       
     MAJOR DEPRESSIVE DISORDER, SINGLE EPISOD                     

 

 2019            DONOVAN ANDRES MD            Ot            F41.9       
     ANXIETY DISORDER, UNSPECIFIED                     

 

 2019            DONOVAN ANDRES MD            Ot            F43.10      
      POST-TRAUMATIC STRESS DISORDER, UNSPECIF                     

 

 2019            DONOVAN ANDRES MD            Ot            J40         
   BRONCHITIS, NOT SPECIFIED AS ACUTE OR CH                     

 

 2019            DONOVAN ANDRES MD            Ot            R05         
   COUGH                     

 

 2019            DONOVAN ANDRES MD            Ot            Z79.52      
      LONG TERM (CURRENT) USE OF SYSTEMIC STER                     

 

 2019            DONOVAN ANDRES MD            Ot            F12.10      
      CANNABIS ABUSE, UNCOMPLICATED                     

 

 2019            DONOVAN ANDRES MD            Ot            F32.9       
     MAJOR DEPRESSIVE DISORDER, SINGLE EPISOD                     

 

 2019            DONOVAN ANDRES MD            Ot            F41.9       
     ANXIETY DISORDER, UNSPECIFIED                     

 

 2019            DONOVAN ANDRES MD            Ot            F43.10      
      POST-TRAUMATIC STRESS DISORDER, UNSPECIF                     

 

 2019            DONOVAN ANDRES MD            Ot            J40         
   BRONCHITIS, NOT SPECIFIED AS ACUTE OR CH                     

 

 2019            DONOVAN ANDRES MD            Ot            R05         
   COUGH                     

 

 2019            DMITRY SOLORIO DONOVAN S            Ot            Z79.52      
      LONG TERM (CURRENT) USE OF SYSTEMIC STER                     



                                                                               
                                                                               
                                                                  



Procedures

      





 Code            Description            Performed By            Performed On   
     

 

             27930                                  PREGNANCY TEST, URINE (IN-
HOUSE)                                   2014        

 

             18552                                  STREP A  (IN-HOUSE)        
                           2014        

 

             25366                                  PREGNANCY TEST, URINE (IN-
HOUSE)                                   2014        



                      



Results

      





 Test            Result            Range        









 Complete urinalysis with reflex to culture - 06/10/18 16:49         









 Urine color determination            YELLOW             NRG        

 

 Urine clarity determination            VERY CLOUDY             NRG        

 

 Urine pH measurement by test strip            7             5-9        

 

 Specific gravity of urine by test strip            1.010             1.016-
1.022        

 

 Urine protein assay by test strip, semi-quantitative            NEGATIVE      
       NEGATIVE        

 

 Urine glucose detection by automated test strip            NEGATIVE           
  NEGATIVE        

 

 Erythrocytes detection in urine sediment by light microscopy            3+    
         NEGATIVE        

 

 Urine ketones detection by automated test strip            NEGATIVE           
  NEGATIVE        

 

 Urine nitrite detection by test strip            NEGATIVE             NEGATIVE
        

 

 Urine total bilirubin detection by test strip            NEGATIVE             
NEGATIVE        

 

 Urine urobilinogen measurement by automated test strip (mass/volume)          
  NORMAL             NORMAL        

 

 Urine leukocyte esterase detection by dipstick            3+             
NEGATIVE        

 

 Automated urine sediment erythrocyte count by microscopy (number/high power 
field)            RARE             NRG        

 

 Automated urine sediment leukocyte count by microscopy (number/high power field
)             [HPF]            NRG        

 

 Bacteria detection in urine sediment by light microscopy            NEGATIVE  
           NRG        

 

 Squamous epithelial cells detection in urine sediment by light microscopy     
       10-25             NRG        

 

 Crystals detection in urine sediment by light microscopy            PRESENT   
          NRG        

 

 Casts detection in urine sediment by light microscopy            NONE         
    NRG        

 

 Mucus detection in urine sediment by light microscopy            NEGATIVE     
        NRG        

 

 Complete urinalysis with reflex to culture            NO             NRG      
  

 

 Amorphous sediment detection in urine sediment by light microscopy            
LARGE EMILIANA URATES             NRG        

 

 Renal epithelial cells detection in urine sediment by light microscopy        
    NONE             NRG        









 Complete urinalysis with reflex to culture - 18 03:30         









 Urine color determination            BIJU             NRG        

 

 Urine clarity determination            SLIGHTLY CLOUDY             NRG        

 

 Urine pH measurement by test strip            5             5-9        

 

 Specific gravity of urine by test strip            1.030             1.016-
1.022        

 

 Urine protein assay by test strip, semi-quantitative            2+             
NEGATIVE        

 

 Urine glucose detection by automated test strip            NEGATIVE           
  NEGATIVE        

 

 Erythrocytes detection in urine sediment by light microscopy            5+    
         NEGATIVE        

 

 Urine ketones detection by automated test strip            1+             
NEGATIVE        

 

 Urine nitrite detection by test strip            NEGATIVE             NEGATIVE
        

 

 Urine total bilirubin detection by test strip            NEGATIVE             
NEGATIVE        

 

 Urine urobilinogen measurement by automated test strip (mass/volume)          
  1 mg/dL            NORMAL        

 

 Urine leukocyte esterase detection by dipstick            2+             
NEGATIVE        

 

 Automated urine sediment erythrocyte count by microscopy (number/high power 
field)            > [HPF]            NRG        

 

 Automated urine sediment leukocyte count by microscopy (number/high power field
)             [HPF]            NRG        

 

 Bacteria detection in urine sediment by light microscopy            MODERATE  
           NRG        

 

 Squamous epithelial cells detection in urine sediment by light microscopy     
       2-5             NRG        

 

 Crystals detection in urine sediment by light microscopy            NONE      
       NRG        

 

 Casts detection in urine sediment by light microscopy            NONE         
    NRG        

 

 Mucus detection in urine sediment by light microscopy            NEGATIVE     
        NRG        

 

 Complete urinalysis with reflex to culture            YES             NRG     
   









 Bacterial urine culture - 18 03:30         









 Bacterial urine culture            SEE COMMEN             NRG        

 

 COLONY COUNT            .             NRG        









 Complete urinalysis with reflex to culture - 18 23:20         









 Urine color determination            YELLOW             NRG        

 

 Urine clarity determination            SLIGHTLY CLOUDY             NRG        

 

 Urine pH measurement by test strip            7             5-9        

 

 Specific gravity of urine by test strip            1.010             1.016-
1.022        

 

 Urine protein assay by test strip, semi-quantitative            NEGATIVE      
       NEGATIVE        

 

 Urine glucose detection by automated test strip            NEGATIVE           
  NEGATIVE        

 

 Erythrocytes detection in urine sediment by light microscopy            5+    
         NEGATIVE        

 

 Urine ketones detection by automated test strip            NEGATIVE           
  NEGATIVE        

 

 Urine nitrite detection by test strip            NEGATIVE             NEGATIVE
        

 

 Urine total bilirubin detection by test strip            NEGATIVE             
NEGATIVE        

 

 Urine urobilinogen measurement by automated test strip (mass/volume)          
  NORMAL             NORMAL        

 

 Urine leukocyte esterase detection by dipstick            1+             
NEGATIVE        

 

 Automated urine sediment erythrocyte count by microscopy (number/high power 
field)            > [HPF]            NRG        

 

 Automated urine sediment leukocyte count by microscopy (number/high power field
)             [HPF]            NRG        

 

 Bacteria detection in urine sediment by light microscopy            TRACE     
        NRG        

 

 Squamous epithelial cells detection in urine sediment by light microscopy     
       2-5             NRG        

 

 Crystals detection in urine sediment by light microscopy            NONE      
       NRG        

 

 Casts detection in urine sediment by light microscopy            NONE         
    NRG        

 

 Mucus detection in urine sediment by light microscopy            SMALL        
     NRG        

 

 Complete urinalysis with reflex to culture            NO             NRG      
  









 Complete urinalysis with reflex to culture - 18 05:45         









 Urine color determination            YELLOW             NRG        

 

 Urine clarity determination            VERY CLOUDY             NRG        

 

 Urine pH measurement by test strip            7             5-9        

 

 Specific gravity of urine by test strip            1.010             1.016-
1.022        

 

 Urine protein assay by test strip, semi-quantitative            NEGATIVE      
       NEGATIVE        

 

 Urine glucose detection by automated test strip            NEGATIVE           
  NEGATIVE        

 

 Erythrocytes detection in urine sediment by light microscopy            
NEGATIVE             NEGATIVE        

 

 Urine ketones detection by automated test strip            NEGATIVE           
  NEGATIVE        

 

 Urine nitrite detection by test strip            NEGATIVE             NEGATIVE
        

 

 Urine total bilirubin detection by test strip            NEGATIVE             
NEGATIVE        

 

 Urine urobilinogen measurement by automated test strip (mass/volume)          
  NORMAL             NORMAL        

 

 Urine leukocyte esterase detection by dipstick            1+             
NEGATIVE        

 

 Automated urine sediment erythrocyte count by microscopy (number/high power 
field)            NONE             NRG        

 

 Automated urine sediment leukocyte count by microscopy (number/high power field
)            RARE             NRG        

 

 Bacteria detection in urine sediment by light microscopy            TRACE     
        NRG        

 

 Squamous epithelial cells detection in urine sediment by light microscopy     
       RARE             NRG        

 

 Crystals detection in urine sediment by light microscopy            PRESENT   
          NRG        

 

 Casts detection in urine sediment by light microscopy            NONE         
    NRG        

 

 Mucus detection in urine sediment by light microscopy            NEGATIVE     
        NRG        

 

 Complete urinalysis with reflex to culture            NO             NRG      
  

 

 Amorphous sediment detection in urine sediment by light microscopy            
LARGE EMILIANA URATES             NRG        









 Complete blood count (CBC) with automated white blood cell (WBC) differential 
- 18 06:00         









 Blood leukocytes automated count (number/volume)            10.7 10*3/uL      
      4.3-11.0        

 

 Blood erythrocytes automated count (number/volume)            4.81 10*6/uL    
        4.35-5.85        

 

 Venous blood hemoglobin measurement (mass/volume)            13.7 g/dL        
    11.5-16.0        

 

 Blood hematocrit (volume fraction)            40 %            35-52        

 

 Automated erythrocyte mean corpuscular volume            84 [foz_us]          
  80-99        

 

 Automated erythrocyte mean corpuscular hemoglobin (mass per erythrocyte)      
      29 pg            25-34        

 

 Automated erythrocyte mean corpuscular hemoglobin concentration measurement (
mass/volume)            34 g/dL            32-36        

 

 Automated erythrocyte distribution width ratio            12.5 %            
10.0-14.5        

 

 Automated blood platelet count (count/volume)            324 10*3/uL          
  130-400        

 

 Automated blood platelet mean volume measurement            10.4 [foz_us]     
       7.4-10.4        

 

 Automated blood neutrophils/100 leukocytes            60 %            42-75   
     

 

 Automated blood lymphocytes/100 leukocytes            29 %            12-44   
     

 

 Blood monocytes/100 leukocytes            9 %            0-12        

 

 Automated blood eosinophils/100 leukocytes            2 %            0-10     
   

 

 Automated blood basophils/100 leukocytes            0 %            0-10        

 

 Blood neutrophils automated count (number/volume)            6.4 10*3         
   1.8-7.8        

 

 Blood lymphocytes automated count (number/volume)            3.1 10*3         
   1.0-4.0        

 

 Blood monocytes automated count (number/volume)            0.9 10*3            
0.0-1.0        

 

 Automated eosinophil count            0.2 10*3/uL            0.0-0.3        

 

 Automated blood basophil count (count/volume)            0.0 10*3/uL          
  0.0-0.1        









 Serum or plasma choriogonadotropin (pregnancy test) detection - 18 06:00
         









 Serum or plasma choriogonadotropin (pregnancy test) detection            
NEGATIVE             NEGATIVE        









 Comprehensive metabolic panel - 18 06:00         









 Serum or plasma sodium measurement (moles/volume)            142 mmol/L       
     135-145        

 

 Serum or plasma potassium measurement (moles/volume)            4.0 mmol/L    
        3.6-5.0        

 

 Serum or plasma chloride measurement (moles/volume)            108 mmol/L     
               

 

 Carbon dioxide            22 mmol/L            21-32        

 

 Serum or plasma anion gap determination (moles/volume)            12 mmol/L   
         5-14        

 

 Serum or plasma urea nitrogen measurement (mass/volume)            12 mg/dL   
         7-18        

 

 Serum or plasma creatinine measurement (mass/volume)            0.82 mg/dL    
        0.60-1.30        

 

 Serum or plasma urea nitrogen/creatinine mass ratio            15             
NRG        

 

 Serum or plasma creatinine measurement with calculation of estimated 
glomerular filtration rate            >             NRG        

 

 Serum or plasma glucose measurement (mass/volume)            116 mg/dL        
            

 

 Serum or plasma calcium measurement (mass/volume)            9.8 mg/dL        
    8.5-10.1        

 

 Serum or plasma total bilirubin measurement (mass/volume)            0.3 mg/dL
            0.1-1.0        

 

 Serum or plasma alkaline phosphatase measurement (enzymatic activity/volume)  
          94 U/L                    

 

 Serum or plasma aspartate aminotransferase measurement (enzymatic activity/
volume)            15 U/L            5-34        

 

 Serum or plasma alanine aminotransferase measurement (enzymatic activity/volume
)            14 U/L            0-55        

 

 Serum or plasma protein measurement (mass/volume)            7.2 g/dL         
   6.4-8.2        

 

 Serum or plasma albumin measurement (mass/volume)            4.5 g/dL         
   3.2-4.5        

 

 CALCIUM CORRECTED            9.4 mg/dL            8.5-10.1        









 Lipase - 18 06:00         









 Lipase            26 U/L            8-78        









 Methicillin resistant Staphylococcus aureus (MRSA) screening culture -  11:30         









 Methicillin resistant Staphylococcus aureus (MRSA) screening culture          
  NEG             NRG        









 Complete blood count (CBC) with automated white blood cell (WBC) differential 
- 19 15:44         









 Blood leukocytes automated count (number/volume)            11.0 10*3/uL      
      4.3-11.0        

 

 Blood erythrocytes automated count (number/volume)            4.63 10*6/uL    
        4.35-5.85        

 

 Venous blood hemoglobin measurement (mass/volume)            13.3 g/dL        
    11.5-16.0        

 

 Blood hematocrit (volume fraction)            38 %            35-52        

 

 Automated erythrocyte mean corpuscular volume            83 [foz_us]          
  80-99        

 

 Automated erythrocyte mean corpuscular hemoglobin (mass per erythrocyte)      
      29 pg            25-34        

 

 Automated erythrocyte mean corpuscular hemoglobin concentration measurement (
mass/volume)            35 g/dL            32-36        

 

 Automated erythrocyte distribution width ratio            12.2 %            
10.0-14.5        

 

 Automated blood platelet count (count/volume)            385 10*3/uL          
  130-400        

 

 Automated blood platelet mean volume measurement            10.1 [foz_us]     
       7.4-10.4        

 

 Automated blood neutrophils/100 leukocytes            68 %            42-75   
     

 

 Automated blood lymphocytes/100 leukocytes            24 %            12-44   
     

 

 Blood monocytes/100 leukocytes            7 %            0-12        

 

 Automated blood eosinophils/100 leukocytes            2 %            0-10     
   

 

 Automated blood basophils/100 leukocytes            0 %            0-10        

 

 Blood neutrophils automated count (number/volume)            7.5 10*3         
   1.8-7.8        

 

 Blood lymphocytes automated count (number/volume)            2.6 10*3         
   1.0-4.0        

 

 Blood monocytes automated count (number/volume)            0.8 10*3            
0.0-1.0        

 

 Automated eosinophil count            0.2 10*3/uL            0.0-0.3        

 

 Automated blood basophil count (count/volume)            0.0 10*3/uL          
  0.0-0.1        



                                    



Encounters

      





 ACCT No.            Visit Date/Time            Discharge            Status    
        Pt. Type            Provider            Facility            Loc./Unit  
          Complaint        

 

 [id number]            2014 14:24:00            2014 23:59:59          
  [facility]            Outpatient            ANJU SPRAGUE                  
                             

 

 [id number]            2014 13:39:00            2014 23:59:59          
  [facility]            Outpatient            JOSE ELIAS LAWTON           
                                    

 

 [id number]            2019 15:15:00            2019 17:08:00    
        [facility]            Outpatient            DMITRY SOLORIO, DONOVAN ANDERSON            [address] 
[facility]            ER            UPPER RESPIRATORY 
PROBLEMS        

 

 [id number]            2018 08:40:00            2018 17:40:00    
        DIS            Outpatient            EUGENIO SOLORIO, MASSIEL            [address] 
[facility]            CHOLELITRIASIS/
CHOLECYSTITIS        

 

 [id number]            2018 22:41:00            2018 00:11:00    
        DIS            Emergency            KAN SOLORIO, ANUEL HO            
[facility]            ER            PAIN IN UTERUS 
SHOOTING UP LOWER BACK        

 

 [id number]            2018 10:06:00            2018 12:12:00    
        DIS            Emergency            ANSHU THAKUR            [address] 
[facility]            ER            COUGH/SOA        

 

 [id number]            2018 03:18:00            2018 04:10:00    
        DIS            Emergency            KAN SOLORIO, ANUEL HO            
[facility]            ER            PELVIC PAIN        

 

 [id number]            06/10/2018 16:12:00            06/10/2018 17:40:00    
        DIS            Emergency            TABBY GARCIA            [address] 
[facility]            ER            RECTAL BLEEDING        

 

 [id number]            2013 15:20:00            2013 23:59:59    
        [facility]            Outpatient                                              
              

 

 [id number]            2019 13:20:00            2019 23:59:59            
[facility]            Outpatient            TORRI JACOB NIDIA                         
[facility]                     

 

 [id number]            2018 18:18:00                         ACT            
Unknown